# Patient Record
Sex: MALE | Race: WHITE | NOT HISPANIC OR LATINO | URBAN - METROPOLITAN AREA
[De-identification: names, ages, dates, MRNs, and addresses within clinical notes are randomized per-mention and may not be internally consistent; named-entity substitution may affect disease eponyms.]

---

## 2023-03-22 ENCOUNTER — TELEPHONE (OUTPATIENT)
Dept: PRIMARY CARE | Facility: CLINIC | Age: 63
End: 2023-03-22
Payer: MEDICARE

## 2023-03-22 DIAGNOSIS — M10.9 GOUT, UNSPECIFIED CAUSE, UNSPECIFIED CHRONICITY, UNSPECIFIED SITE: Primary | ICD-10-CM

## 2023-03-22 PROBLEM — I10 BENIGN ESSENTIAL HYPERTENSION: Status: ACTIVE | Noted: 2023-03-22

## 2023-03-22 PROBLEM — H40.059 OHT (OCULAR HYPERTENSION): Status: ACTIVE | Noted: 2023-03-22

## 2023-03-22 PROBLEM — M25.512 PAIN IN JOINT OF LEFT SHOULDER: Status: ACTIVE | Noted: 2023-03-22

## 2023-03-22 PROBLEM — H40.9 GLAUCOMA: Status: ACTIVE | Noted: 2023-03-22

## 2023-03-22 PROBLEM — E78.00 HYPERCHOLESTEROLEMIA: Status: ACTIVE | Noted: 2023-03-22

## 2023-03-22 PROBLEM — I25.10 ATHEROSCLEROTIC HEART DISEASE OF NATIVE CORONARY ARTERY WITHOUT ANGINA PECTORIS: Status: ACTIVE | Noted: 2023-03-22

## 2023-03-22 PROBLEM — E11.40 DIABETIC NEUROPATHY (MULTI): Status: ACTIVE | Noted: 2023-03-22

## 2023-03-22 PROBLEM — H26.9 CATARACT: Status: ACTIVE | Noted: 2023-03-22

## 2023-03-22 PROBLEM — H40.1190 POAG (PRIMARY OPEN-ANGLE GLAUCOMA): Status: ACTIVE | Noted: 2023-03-22

## 2023-03-22 PROBLEM — H21.239 PDS (PIGMENTARY DISPERSION SYNDROME): Status: ACTIVE | Noted: 2023-03-22

## 2023-03-22 PROBLEM — M19.90 OSTEOARTHRITIS: Status: ACTIVE | Noted: 2023-03-22

## 2023-03-22 PROBLEM — N18.30 CHRONIC KIDNEY DISEASE, STAGE 3 (MULTI): Status: ACTIVE | Noted: 2023-03-22

## 2023-03-22 PROBLEM — E11.9 DIABETES MELLITUS (MULTI): Status: ACTIVE | Noted: 2023-03-22

## 2023-03-22 RX ORDER — TORSEMIDE 20 MG/1
TABLET ORAL
COMMUNITY
Start: 2019-02-07

## 2023-03-22 RX ORDER — TAMSULOSIN HYDROCHLORIDE 0.4 MG/1
1 CAPSULE ORAL DAILY
COMMUNITY
Start: 2021-09-02 | End: 2023-11-13 | Stop reason: SDUPTHER

## 2023-03-22 RX ORDER — PANTOPRAZOLE SODIUM 40 MG/1
1 TABLET, DELAYED RELEASE ORAL DAILY
COMMUNITY
Start: 2019-05-30 | End: 2023-07-24

## 2023-03-22 RX ORDER — ATORVASTATIN CALCIUM 80 MG/1
1 TABLET, FILM COATED ORAL DAILY
COMMUNITY
Start: 2019-02-07 | End: 2023-12-17

## 2023-03-22 RX ORDER — DORZOLAMIDE HYDROCHLORIDE AND TIMOLOL MALEATE 20; 5 MG/ML; MG/ML
1 SOLUTION/ DROPS OPHTHALMIC 2 TIMES DAILY
COMMUNITY
Start: 2020-03-25

## 2023-03-22 RX ORDER — COLCHICINE 0.6 MG/1
0.6 TABLET ORAL 2 TIMES DAILY
Qty: 6 TABLET | Refills: 0 | Status: SHIPPED | OUTPATIENT
Start: 2023-03-22 | End: 2023-03-25

## 2023-03-22 RX ORDER — BLOOD SUGAR DIAGNOSTIC
STRIP MISCELLANEOUS
COMMUNITY
Start: 2018-03-26 | End: 2023-08-09 | Stop reason: ALTCHOICE

## 2023-03-22 RX ORDER — METOLAZONE 2.5 MG/1
TABLET ORAL
COMMUNITY
End: 2023-08-09 | Stop reason: ALTCHOICE

## 2023-03-22 RX ORDER — INSULIN GLARGINE 100 [IU]/ML
INJECTION, SOLUTION SUBCUTANEOUS
COMMUNITY
Start: 2019-02-07 | End: 2023-12-17

## 2023-03-22 RX ORDER — LATANOPROSTENE BUNOD 0.24 MG/ML
SOLUTION/ DROPS OPHTHALMIC
COMMUNITY
Start: 2020-09-30

## 2023-03-22 RX ORDER — METOPROLOL SUCCINATE 25 MG/1
TABLET, EXTENDED RELEASE ORAL
COMMUNITY

## 2023-03-22 RX ORDER — CARVEDILOL 12.5 MG/1
TABLET ORAL
COMMUNITY
Start: 2021-10-05 | End: 2023-08-15

## 2023-03-22 RX ORDER — ISOSORBIDE MONONITRATE 60 MG/1
1 TABLET, EXTENDED RELEASE ORAL DAILY
COMMUNITY
End: 2024-04-03 | Stop reason: WASHOUT

## 2023-03-22 RX ORDER — INSULIN LISPRO 100 [IU]/ML
INJECTION, SOLUTION INTRAVENOUS; SUBCUTANEOUS
COMMUNITY
Start: 2020-10-04

## 2023-03-22 RX ORDER — PANTOPRAZOLE SODIUM 20 MG/1
TABLET, DELAYED RELEASE ORAL
COMMUNITY
Start: 2023-01-22 | End: 2024-04-18 | Stop reason: SDUPTHER

## 2023-03-22 RX ORDER — AMLODIPINE BESYLATE 10 MG/1
1 TABLET ORAL DAILY
COMMUNITY
Start: 2019-02-07 | End: 2023-08-09 | Stop reason: ALTCHOICE

## 2023-03-22 RX ORDER — BLOOD SUGAR DIAGNOSTIC
STRIP MISCELLANEOUS
COMMUNITY
Start: 2019-04-17 | End: 2024-03-04

## 2023-03-22 RX ORDER — BRIMONIDINE TARTRATE 2 MG/ML
SOLUTION/ DROPS OPHTHALMIC 2 TIMES DAILY
COMMUNITY
Start: 2019-11-04

## 2023-04-13 LAB
PROSTATE SPECIFIC AG (NG/ML) IN SER/PLAS: 4.4 NG/ML (ref 0–4)
PROSTATE SPECIFIC AG FREE (NG/ML) IN SER/PLAS: 1.4 NG/ML
PROSTATE SPECIFIC AG FREE/PROSTATE SPECIFIC AG TOTAL IN SER/PLAS: 32 %

## 2023-07-24 DIAGNOSIS — K21.9 GASTROESOPHAGEAL REFLUX DISEASE WITHOUT ESOPHAGITIS: Primary | ICD-10-CM

## 2023-07-24 RX ORDER — PANTOPRAZOLE SODIUM 40 MG/1
40 TABLET, DELAYED RELEASE ORAL DAILY
Qty: 90 TABLET | Refills: 1 | Status: SHIPPED | OUTPATIENT
Start: 2023-07-24 | End: 2023-08-09 | Stop reason: ALTCHOICE

## 2023-08-09 ENCOUNTER — OFFICE VISIT (OUTPATIENT)
Dept: PRIMARY CARE | Facility: CLINIC | Age: 63
End: 2023-08-09
Payer: MEDICARE

## 2023-08-09 ENCOUNTER — LAB (OUTPATIENT)
Dept: LAB | Facility: LAB | Age: 63
End: 2023-08-09
Payer: MEDICARE

## 2023-08-09 VITALS
SYSTOLIC BLOOD PRESSURE: 136 MMHG | OXYGEN SATURATION: 98 % | DIASTOLIC BLOOD PRESSURE: 86 MMHG | WEIGHT: 274.1 LBS | RESPIRATION RATE: 18 BRPM | HEART RATE: 92 BPM | BODY MASS INDEX: 39.24 KG/M2 | HEIGHT: 70 IN

## 2023-08-09 DIAGNOSIS — Z79.4 TYPE 2 DIABETES MELLITUS WITH CHRONIC KIDNEY DISEASE, WITH LONG-TERM CURRENT USE OF INSULIN, UNSPECIFIED CKD STAGE (MULTI): ICD-10-CM

## 2023-08-09 DIAGNOSIS — N18.30 STAGE 3 CHRONIC KIDNEY DISEASE, UNSPECIFIED WHETHER STAGE 3A OR 3B CKD (MULTI): ICD-10-CM

## 2023-08-09 DIAGNOSIS — Z95.0 CARDIAC PACEMAKER IN SITU: ICD-10-CM

## 2023-08-09 DIAGNOSIS — I50.33 ACUTE ON CHRONIC DIASTOLIC CONGESTIVE HEART FAILURE (MULTI): ICD-10-CM

## 2023-08-09 DIAGNOSIS — E11.42 DIABETIC POLYNEUROPATHY ASSOCIATED WITH TYPE 2 DIABETES MELLITUS (MULTI): ICD-10-CM

## 2023-08-09 DIAGNOSIS — I25.10 ATHEROSCLEROSIS OF NATIVE CORONARY ARTERY OF NATIVE HEART WITHOUT ANGINA PECTORIS: Primary | ICD-10-CM

## 2023-08-09 DIAGNOSIS — Z00.00 MEDICARE ANNUAL WELLNESS VISIT, SUBSEQUENT: ICD-10-CM

## 2023-08-09 DIAGNOSIS — E11.22 TYPE 2 DIABETES MELLITUS WITH CHRONIC KIDNEY DISEASE, WITH LONG-TERM CURRENT USE OF INSULIN, UNSPECIFIED CKD STAGE (MULTI): ICD-10-CM

## 2023-08-09 DIAGNOSIS — I25.10 ATHEROSCLEROSIS OF NATIVE CORONARY ARTERY OF NATIVE HEART WITHOUT ANGINA PECTORIS: ICD-10-CM

## 2023-08-09 DIAGNOSIS — E66.01 OBESITY, CLASS III, BMI 40-49.9 (MORBID OBESITY) (MULTI): ICD-10-CM

## 2023-08-09 LAB
ALANINE AMINOTRANSFERASE (SGPT) (U/L) IN SER/PLAS: 20 U/L (ref 10–52)
ALBUMIN (G/DL) IN SER/PLAS: 3.9 G/DL (ref 3.4–5)
ALKALINE PHOSPHATASE (U/L) IN SER/PLAS: 76 U/L (ref 33–136)
ANION GAP IN SER/PLAS: 13 MMOL/L (ref 10–20)
ASPARTATE AMINOTRANSFERASE (SGOT) (U/L) IN SER/PLAS: 20 U/L (ref 9–39)
BILIRUBIN TOTAL (MG/DL) IN SER/PLAS: 0.7 MG/DL (ref 0–1.2)
CALCIUM (MG/DL) IN SER/PLAS: 9.3 MG/DL (ref 8.6–10.3)
CARBON DIOXIDE, TOTAL (MMOL/L) IN SER/PLAS: 27 MMOL/L (ref 21–32)
CHLORIDE (MMOL/L) IN SER/PLAS: 104 MMOL/L (ref 98–107)
CREATININE (MG/DL) IN SER/PLAS: 1.14 MG/DL (ref 0.5–1.3)
ERYTHROCYTE DISTRIBUTION WIDTH (RATIO) BY AUTOMATED COUNT: 21 % (ref 11.5–14.5)
ERYTHROCYTE MEAN CORPUSCULAR HEMOGLOBIN CONCENTRATION (G/DL) BY AUTOMATED: 28.3 G/DL (ref 32–36)
ERYTHROCYTE MEAN CORPUSCULAR VOLUME (FL) BY AUTOMATED COUNT: 79 FL (ref 80–100)
ERYTHROCYTES (10*6/UL) IN BLOOD BY AUTOMATED COUNT: 5.36 X10E12/L (ref 4.5–5.9)
GFR MALE: 72 ML/MIN/1.73M2
GLUCOSE (MG/DL) IN SER/PLAS: 115 MG/DL (ref 74–99)
HEMATOCRIT (%) IN BLOOD BY AUTOMATED COUNT: 42.1 % (ref 41–52)
HEMOGLOBIN (G/DL) IN BLOOD: 11.9 G/DL (ref 13.5–17.5)
LEUKOCYTES (10*3/UL) IN BLOOD BY AUTOMATED COUNT: 9.5 X10E9/L (ref 4.4–11.3)
PLATELETS (10*3/UL) IN BLOOD AUTOMATED COUNT: 172 X10E9/L (ref 150–450)
POTASSIUM (MMOL/L) IN SER/PLAS: 4.1 MMOL/L (ref 3.5–5.3)
PROTEIN TOTAL: 7.2 G/DL (ref 6.4–8.2)
SODIUM (MMOL/L) IN SER/PLAS: 140 MMOL/L (ref 136–145)
UREA NITROGEN (MG/DL) IN SER/PLAS: 17 MG/DL (ref 6–23)

## 2023-08-09 PROCEDURE — 85027 COMPLETE CBC AUTOMATED: CPT

## 2023-08-09 PROCEDURE — 36415 COLL VENOUS BLD VENIPUNCTURE: CPT

## 2023-08-09 PROCEDURE — 99214 OFFICE O/P EST MOD 30 MIN: CPT | Performed by: NURSE PRACTITIONER

## 2023-08-09 PROCEDURE — 3079F DIAST BP 80-89 MM HG: CPT | Performed by: NURSE PRACTITIONER

## 2023-08-09 PROCEDURE — 4010F ACE/ARB THERAPY RXD/TAKEN: CPT | Performed by: NURSE PRACTITIONER

## 2023-08-09 PROCEDURE — 80053 COMPREHEN METABOLIC PANEL: CPT

## 2023-08-09 PROCEDURE — 3008F BODY MASS INDEX DOCD: CPT | Performed by: NURSE PRACTITIONER

## 2023-08-09 PROCEDURE — 1036F TOBACCO NON-USER: CPT | Performed by: NURSE PRACTITIONER

## 2023-08-09 PROCEDURE — 3075F SYST BP GE 130 - 139MM HG: CPT | Performed by: NURSE PRACTITIONER

## 2023-08-09 PROCEDURE — 3051F HG A1C>EQUAL 7.0%<8.0%: CPT | Performed by: NURSE PRACTITIONER

## 2023-08-09 PROCEDURE — G0439 PPPS, SUBSEQ VISIT: HCPCS | Performed by: NURSE PRACTITIONER

## 2023-08-09 RX ORDER — ASPIRIN 81 MG/1
81 TABLET ORAL ONCE
COMMUNITY

## 2023-08-09 RX ORDER — LOSARTAN POTASSIUM 25 MG/1
25 TABLET ORAL DAILY
Qty: 30 TABLET | Refills: 2 | COMMUNITY
Start: 2023-07-13 | End: 2023-10-11

## 2023-08-09 RX ORDER — METFORMIN HYDROCHLORIDE 500 MG/1
500 TABLET ORAL 2 TIMES DAILY
COMMUNITY
Start: 2023-03-15 | End: 2024-03-04

## 2023-08-09 ASSESSMENT — ENCOUNTER SYMPTOMS
FATIGUE: 1
POLYPHAGIA: 0
WEIGHT LOSS: 0
BLURRED VISION: 0
CONFUSION: 0
NERVOUS/ANXIOUS: 0
HUNGER: 0
WEAKNESS: 1
POLYDIPSIA: 1
TREMORS: 0
SWEATS: 0
SEIZURES: 0
HEADACHES: 0
BLACKOUTS: 0
SPEECH DIFFICULTY: 0
DIZZINESS: 0
VISUAL CHANGE: 0

## 2023-08-09 ASSESSMENT — PATIENT HEALTH QUESTIONNAIRE - PHQ9
SUM OF ALL RESPONSES TO PHQ9 QUESTIONS 1 AND 2: 0
1. LITTLE INTEREST OR PLEASURE IN DOING THINGS: NOT AT ALL
2. FEELING DOWN, DEPRESSED OR HOPELESS: NOT AT ALL

## 2023-08-09 ASSESSMENT — ACTIVITIES OF DAILY LIVING (ADL)
TAKING_MEDICATION: INDEPENDENT
GROCERY_SHOPPING: INDEPENDENT
DOING_HOUSEWORK: INDEPENDENT
DRESSING: INDEPENDENT
BATHING: INDEPENDENT
MANAGING_FINANCES: INDEPENDENT

## 2023-08-09 NOTE — PATIENT INSTRUCTIONS
Have your lab work done  Your sugars have been doing well  Follow up with the cardiologist to check your new pacemaker  Follow up with me in 6 months

## 2023-08-09 NOTE — PROGRESS NOTES
Answers submitted by the patient for this visit:  Diabetes Questionnaire (Submitted on 8/9/2023)  Chief Complaint: Diabetes problem  Diabetes type: type 1  MedicAlert ID: No  Disease duration: 23 Years  blurred vision: No  chest pain: Yes  fatigue: Yes  foot paresthesias: No  foot ulcerations: No  polydipsia: Yes  polyphagia: No  polyuria: Yes  visual change: No  weakness: Yes  weight loss: No  Symptom course: improving  confusion: No  dizziness: No  headaches: No  hunger: No  mood changes: No  nervous/anxious: No  pallor: No  seizures: No  sleepiness: Yes  speech difficulty: No  sweats: No  tremors: No  blackouts: No  hospitalization: No  nocturnal hypoglycemia: No  required assistance: No  required glucagon: No  CVA: No  heart disease: Yes  impotence: No  nephropathy: No  peripheral neuropathy: Yes  PVD: Yes  retinopathy: Yes  CAD risks: dyslipidemia, family history, hypertension, obesity  Current treatments: diet, insulin injections, oral agent (monotherapy)  Treatment compliance: all of the time  Dose schedule: pre-breakfast, pre-lunch, pre-dinner, at bedtime  Given by: patient  Injection sites: abdominal wall  Home blood tests: 3-4 x per day  Home urines: <1 x per month  Monitoring compliance: excellent  Blood glucose trend: decreasing rapidly  breakfast time: after 10 am  breakfast glucose level: 110-130  lunch time: 2-3 pm  lunch glucose level: 130-140  dinner time: 7-8 pm  dinner glucose level: 140-180  High score: 140-180  Overall: 130-140  Weight trend: decreasing rapidly  Current diet: low fat/cholesterol  Meal planning: avoidance of concentrated sweets  Exercise: intermittently  Dietitian visit: No  Eye exam current: Yes  Sees podiatrist: Yes  Subjective   Patient ID: Tito Christian is a 62 y.o. male who presents for Follow-up (Patient in today for routine F/U, reports no concerns at this time. ) and Medicare Annual Wellness Visit Subsequent.    Diabetes  He has type 1 diabetes mellitus. No MedicAlert  identification noted. The initial diagnosis of diabetes was made 23 years ago. Hypoglycemia symptoms include sleepiness. Pertinent negatives for hypoglycemia include no confusion, dizziness, headaches, hunger, mood changes, nervousness/anxiousness, pallor, seizures, speech difficulty, sweats or tremors. Associated symptoms include chest pain, fatigue, polydipsia, polyuria and weakness. Pertinent negatives for diabetes include no blurred vision, no foot paresthesias, no foot ulcerations, no polyphagia, no visual change and no weight loss. Pertinent negatives for hypoglycemia complications include no blackouts, no hospitalization, no nocturnal hypoglycemia, no required assistance and no required glucagon injection. Symptoms are improving. Diabetic complications include heart disease, peripheral neuropathy, PVD and retinopathy. Pertinent negatives for diabetic complications include no CVA, impotence or nephropathy. Risk factors for coronary artery disease include dyslipidemia, family history, hypertension and obesity. Current diabetic treatment includes diet, insulin injections and oral agent (monotherapy). He is compliant with treatment all of the time. He is currently taking insulin pre-breakfast, pre-lunch, pre-dinner and at bedtime. Insulin injections are given by patient. Rotation sites for injection include the abdominal wall. His weight is decreasing rapidly. He is following a low fat/cholesterol diet. Meal planning includes avoidance of concentrated sweets. He has not had a previous visit with a dietitian. He participates in exercise intermittently. He monitors blood glucose at home 3-4 x per day. He monitors urine at home <1 x per month. Blood glucose monitoring compliance is excellent. His home blood glucose trend is decreasing rapidly. His breakfast blood glucose is taken after 10 am. His breakfast blood glucose range is generally 110-130 mg/dl. His lunch blood glucose is taken between 2-3 pm. His lunch  "blood glucose range is generally 130-140 mg/dl. His dinner blood glucose is taken between 7-8 pm. His dinner blood glucose range is generally 140-180 mg/dl. His overall blood glucose range is 130-140 mg/dl. He sees a podiatrist.Eye exam is current.        Review of Systems   Constitutional:  Positive for fatigue. Negative for weight loss.   Eyes:  Negative for blurred vision.   Cardiovascular:  Positive for chest pain.   Endocrine: Positive for polydipsia and polyuria. Negative for polyphagia.   Genitourinary:  Negative for impotence.   Skin:  Negative for pallor.   Neurological:  Positive for weakness. Negative for dizziness, tremors, seizures, speech difficulty and headaches.   Psychiatric/Behavioral:  Negative for confusion. The patient is not nervous/anxious.        Objective   /86   Pulse 92   Resp 18   Ht 1.778 m (5' 10\")   Wt 124 kg (274 lb 1.6 oz)   SpO2 98%   BMI 39.33 kg/m²     Physical Exam    Assessment/Plan          "

## 2023-08-09 NOTE — PROGRESS NOTES
Subjective   Reason for Visit: Tito Christian is an 62 y.o. male here for a Medicare Wellness visit.     Past Medical, Surgical, and Family History reviewed and updated in chart.    Reviewed all medications by prescribing practitioner or clinical pharmacist (such as prescriptions, OTCs, herbal therapies and supplements) and documented in the medical record.    Presents to follow up and for AMW    Follows Dr. Segura for Dm A1c in June 7.0  Denies polyuria, polydipsia, or blurred vision    Last month had worsening shortness of breath - found to have bradycardia and CHB.  DDD pacemaker inserted.  Follows [facility] cardiology  Site is healing without infection.  Area is 'sore'  He has returned to driving  Has not had shortness of breath since discharge    Did have bilateral leg wounds - ID was consulted while in the hospital     Had sleep study in the hospital - +DIANE and using CPAP    Diabetes  He has type 1 diabetes mellitus. No MedicAlert identification noted. The initial diagnosis of diabetes was made 23 years ago. Hypoglycemia symptoms include sleepiness. Pertinent negatives for hypoglycemia include no confusion, dizziness, headaches, hunger, mood changes, nervousness/anxiousness, pallor, seizures, speech difficulty, sweats or tremors. Associated symptoms include chest pain, fatigue, polydipsia, polyuria and weakness. Pertinent negatives for diabetes include no blurred vision, no foot paresthesias, no foot ulcerations, no polyphagia, no visual change and no weight loss. Pertinent negatives for hypoglycemia complications include no blackouts, no hospitalization, no nocturnal hypoglycemia, no required assistance and no required glucagon injection. Symptoms are improving. Diabetic complications include heart disease, peripheral neuropathy, PVD and retinopathy. Pertinent negatives for diabetic complications include no CVA, impotence or nephropathy. Risk factors for coronary artery disease include dyslipidemia, family  "history, hypertension and obesity. Current diabetic treatment includes diet, insulin injections and oral agent (monotherapy). He is compliant with treatment all of the time. He is currently taking insulin pre-breakfast, pre-lunch, pre-dinner and at bedtime. Insulin injections are given by patient. Rotation sites for injection include the abdominal wall. His weight is decreasing rapidly. He is following a low fat/cholesterol diet. Meal planning includes avoidance of concentrated sweets. He has not had a previous visit with a dietitian. He participates in exercise intermittently. He monitors blood glucose at home 3-4 x per day. He monitors urine at home <1 x per month. Blood glucose monitoring compliance is excellent. His home blood glucose trend is decreasing rapidly. His breakfast blood glucose is taken after 10 am. His breakfast blood glucose range is generally 110-130 mg/dl. His lunch blood glucose is taken between 2-3 pm. His lunch blood glucose range is generally 130-140 mg/dl. His dinner blood glucose is taken between 7-8 pm. His dinner blood glucose range is generally 140-180 mg/dl. His overall blood glucose range is 130-140 mg/dl. He sees a podiatrist.Eye exam is current.       Patient Care Team:  MARVIN Aranda as PCP - General  MARVIN Aranda as PCP - [employer] PCP     Review of Systems   Constitutional:  Positive for fatigue. Negative for weight loss.   Eyes:  Negative for blurred vision.   Cardiovascular:  Positive for chest pain.   Endocrine: Positive for polydipsia and polyuria. Negative for polyphagia.   Genitourinary:  Negative for impotence.   Skin:  Negative for pallor.   Neurological:  Positive for weakness. Negative for dizziness, tremors, seizures, speech difficulty and headaches.   Psychiatric/Behavioral:  Negative for confusion. The patient is not nervous/anxious.        Objective   Vitals:  /86   Pulse 92   Resp 18   Ht 1.778 m (5' 10\")   Wt " 124 kg (274 lb 1.6 oz)   SpO2 98%   BMI 39.33 kg/m²       Physical Exam  Vitals and nursing note reviewed.   Constitutional:       General: He is not in acute distress.     Appearance: Normal appearance.   HENT:      Head: Normocephalic and atraumatic.      Right Ear: Tympanic membrane, ear canal and external ear normal.      Left Ear: Tympanic membrane, ear canal and external ear normal.      Nose: Nose normal.      Mouth/Throat:      Mouth: Mucous membranes are moist.      Pharynx: Oropharynx is clear.   Eyes:      Extraocular Movements: Extraocular movements intact.      Conjunctiva/sclera: Conjunctivae normal.      Pupils: Pupils are equal, round, and reactive to light.   Neck:      Vascular: No carotid bruit.   Cardiovascular:      Rate and Rhythm: Normal rate and regular rhythm.      Pulses: Normal pulses.      Heart sounds: Normal heart sounds.   Pulmonary:      Effort: Pulmonary effort is normal.      Breath sounds: Normal breath sounds.   Abdominal:      General: Bowel sounds are normal. There is no distension.      Palpations: Abdomen is soft.      Tenderness: There is no abdominal tenderness.   Musculoskeletal:         General: Normal range of motion.      Cervical back: Normal range of motion.      Right lower leg: No edema.      Left lower leg: No edema.   Lymphadenopathy:      Cervical: No cervical adenopathy.   Skin:     General: Skin is warm and dry.      Capillary Refill: Capillary refill takes less than 2 seconds.      Comments: Pacemaker incision healing - no signs of infection   Neurological:      General: No focal deficit present.      Mental Status: He is alert and oriented to person, place, and time. Mental status is at baseline.   Psychiatric:         Mood and Affect: Mood normal.         Behavior: Behavior normal.         Thought Content: Thought content normal.         Judgment: Judgment normal.         Assessment/Plan   Problem List Items Addressed This Visit       Atherosclerotic heart  disease of native coronary artery without angina pectoris - Primary    Relevant Orders    CBC (Completed)    Comprehensive Metabolic Panel (Completed)    Chronic kidney disease, stage 3 (CMS/[facility])    Current Assessment & Plan     Follows nephrology   Last creatinine 1.14         Diabetes mellitus (CMS/[facility])    Overview     Follows endocrinology  Stable A1c 7.0  Follow up with endo as scheduled  Continue metformin  Continue lantus   Continue humalog  Continue jardiance   Current eye exam         Current Assessment & Plan     Follows endocrinology  Stable A1c 7.0  Follow up with endo as scheduled  Continue metformin  Continue lantus   Continue humalog  Continue jardiance   Current eye exam         Diabetic neuropathy (CMS/[facility])    Overview     Follows endocrinology  Stable A1c 7.0  Follow up with endo as scheduled  Continue metformin  Continue lantus   Continue humalog  Continue jardiance   Current eye exam         Current Assessment & Plan     Follows endocrinology  Stable A1c 7.0  Follow up with endo as scheduled  Continue metformin  Continue lantus   Continue humalog  Continue jardiance   Current eye exam         Acute on chronic diastolic congestive heart failure (CMS/[facility])    Overview     PPM inserted one month ago  Follows cardiology at [facility]  Has been stable since discharge from hospital one month ago  Cbc,cmp         Current Assessment & Plan     PPM inserted one month ago  Follows cardiology at [facility]  Has been stable since discharge from hospital one month ago  Cbc,cmp         Cardiac pacemaker in situ    Overview     PPM inserted one month ago  Follows cardiology at [facility]  Has been stable since discharge from hospital one month ago  Cbc,cmp         Current Assessment & Plan     PPM inserted one month ago  Follows cardiology at [facility]  Has been stable since discharge from hospital one month ago  Cbc,cmp         Obesity, Class III, BMI 40-49.9 (morbid obesity) (CMS/[facility])    Current Assessment & Plan     In a face to face  session, I informed patient of their BMI>30. We discussed appropriate nutrition choices and exercise plan to help achieve weight reduction.           Medicare annual wellness visit, subsequent    Overview     - UTD with vaccine   -  colonoscopy 2013 - will be due this year            Current Assessment & Plan     - UTD with vaccine   -  colonoscopy 2013 - will be due this year

## 2023-08-10 PROBLEM — I10 HTN (HYPERTENSION): Status: RESOLVED | Noted: 2019-02-28 | Resolved: 2023-08-10

## 2023-08-10 PROBLEM — R07.9 CHEST PAIN: Status: RESOLVED | Noted: 2023-08-10 | Resolved: 2023-08-10

## 2023-08-10 PROBLEM — M10.00 ACUTE IDIOPATHIC GOUT: Status: RESOLVED | Noted: 2023-07-10 | Resolved: 2023-08-10

## 2023-08-10 PROBLEM — G89.18 ACUTE POSTOPERATIVE PAIN: Status: RESOLVED | Noted: 2019-03-01 | Resolved: 2023-08-10

## 2023-08-10 PROBLEM — Z98.49 CATARACT EXTRACTION STATUS: Status: RESOLVED | Noted: 2023-08-10 | Resolved: 2023-08-10

## 2023-08-10 PROBLEM — I45.9 HEART BLOCK: Status: RESOLVED | Noted: 2023-06-30 | Resolved: 2023-08-10

## 2023-08-10 PROBLEM — I44.1 MOBITZ TYPE 1 SECOND DEGREE AV BLOCK: Status: RESOLVED | Noted: 2023-07-03 | Resolved: 2023-08-10

## 2023-08-10 PROBLEM — H40.9 GLAUCOMA: Status: RESOLVED | Noted: 2023-03-22 | Resolved: 2023-08-10

## 2023-08-10 PROBLEM — Z95.0 CARDIAC PACEMAKER IN SITU: Status: ACTIVE | Noted: 2023-07-10

## 2023-08-10 PROBLEM — M10.9 GOUT: Status: RESOLVED | Noted: 2023-08-10 | Resolved: 2023-08-10

## 2023-08-10 PROBLEM — M25.562 LEFT KNEE PAIN: Status: RESOLVED | Noted: 2023-08-10 | Resolved: 2023-08-10

## 2023-08-10 PROBLEM — I50.33 ACUTE ON CHRONIC DIASTOLIC CONGESTIVE HEART FAILURE (MULTI): Status: ACTIVE | Noted: 2019-04-09

## 2023-08-10 PROBLEM — I44.2: Status: RESOLVED | Noted: 2023-08-10 | Resolved: 2023-08-10

## 2023-08-10 PROBLEM — N40.0 BPH (BENIGN PROSTATIC HYPERPLASIA): Status: ACTIVE | Noted: 2023-07-03

## 2023-08-10 PROBLEM — Z00.00 ROUTINE GENERAL MEDICAL EXAMINATION AT HEALTH CARE FACILITY: Status: ACTIVE | Noted: 2023-08-10

## 2023-08-10 PROBLEM — E66.813 OBESITY, CLASS III, BMI 40-49.9 (MORBID OBESITY): Status: ACTIVE | Noted: 2023-08-10

## 2023-08-10 PROBLEM — J39.2 EDEMA OF THROAT: Status: RESOLVED | Noted: 2019-02-28 | Resolved: 2023-08-10

## 2023-08-10 PROBLEM — R00.1 BRADYCARDIA: Status: RESOLVED | Noted: 2023-08-10 | Resolved: 2023-08-10

## 2023-08-10 PROBLEM — H26.9 CATARACT: Status: RESOLVED | Noted: 2023-03-22 | Resolved: 2023-08-10

## 2023-08-10 PROBLEM — I45.10 RBBB (RIGHT BUNDLE BRANCH BLOCK): Status: RESOLVED | Noted: 2023-07-03 | Resolved: 2023-08-10

## 2023-08-10 PROBLEM — E66.01 OBESITY, CLASS III, BMI 40-49.9 (MORBID OBESITY) (MULTI): Status: ACTIVE | Noted: 2023-08-10

## 2023-08-10 PROBLEM — M25.512 PAIN IN JOINT OF LEFT SHOULDER: Status: RESOLVED | Noted: 2023-03-22 | Resolved: 2023-08-10

## 2023-08-10 PROBLEM — I50.9 CHF (CONGESTIVE HEART FAILURE) (MULTI): Status: RESOLVED | Noted: 2023-08-10 | Resolved: 2023-08-10

## 2023-08-10 ASSESSMENT — PATIENT HEALTH QUESTIONNAIRE - PHQ9
1. LITTLE INTEREST OR PLEASURE IN DOING THINGS: NOT AT ALL
SUM OF ALL RESPONSES TO PHQ9 QUESTIONS 1 AND 2: 0
2. FEELING DOWN, DEPRESSED OR HOPELESS: NOT AT ALL

## 2023-08-10 NOTE — ASSESSMENT & PLAN NOTE
PPM inserted one month ago  Follows cardiology at [facility]  Has been stable since discharge from hospital one month ago  Cbc,cmp

## 2023-08-10 NOTE — ASSESSMENT & PLAN NOTE
PPM inserted one month ago  Follows cardiology at [facility]  Has been stable since discharge from hospital one month ago  Cbc,cmp

## 2023-08-10 NOTE — ASSESSMENT & PLAN NOTE
Follows endocrinology  Stable A1c 7.0  Follow up with endo as scheduled  Continue metformin  Continue lantus   Continue humalog  Continue jardiance   Current eye exam

## 2023-08-13 DIAGNOSIS — I10 BENIGN ESSENTIAL HYPERTENSION: Primary | ICD-10-CM

## 2023-08-15 RX ORDER — CARVEDILOL 12.5 MG/1
12.5 TABLET ORAL
Qty: 180 TABLET | Refills: 3 | Status: SHIPPED | OUTPATIENT
Start: 2023-08-15 | End: 2024-04-18 | Stop reason: SDUPTHER

## 2023-08-31 PROBLEM — Z79.4 LONG TERM CURRENT USE OF INSULIN (MULTI): Status: ACTIVE | Noted: 2023-08-31

## 2023-08-31 PROBLEM — R06.02 SOB (SHORTNESS OF BREATH) ON EXERTION: Status: ACTIVE | Noted: 2023-08-31

## 2023-08-31 PROBLEM — I25.119 CORONARY ARTERY DISEASE INVOLVING NATIVE CORONARY ARTERY OF NATIVE HEART WITH ANGINA PECTORIS (CMS-HCC): Status: ACTIVE | Noted: 2019-02-28

## 2023-08-31 PROBLEM — I20.9 ANGINA PECTORIS (CMS-HCC): Status: ACTIVE | Noted: 2023-08-31

## 2023-08-31 PROBLEM — Z96.1 PSEUDOPHAKIA, LEFT EYE: Status: ACTIVE | Noted: 2023-08-31

## 2023-08-31 PROBLEM — E66.812 OBESITY, CLASS II, BMI 35-39.9: Status: ACTIVE | Noted: 2023-06-30

## 2023-08-31 PROBLEM — E66.9 OBESITY: Status: ACTIVE | Noted: 2023-08-31

## 2023-08-31 PROBLEM — M79.602 PAIN OF LEFT UPPER EXTREMITY: Status: ACTIVE | Noted: 2023-08-31

## 2023-08-31 PROBLEM — I48.0 PAROXYSMAL ATRIAL FIBRILLATION (MULTI): Status: ACTIVE | Noted: 2019-03-20

## 2023-08-31 PROBLEM — R39.11 URINARY HESITANCY: Status: ACTIVE | Noted: 2023-08-31

## 2023-08-31 PROBLEM — S81.809A MULTIPLE OPEN WOUNDS OF LOWER LEG: Status: ACTIVE | Noted: 2023-07-01

## 2023-08-31 PROBLEM — Z97.3 WEARS EYEGLASSES: Status: ACTIVE | Noted: 2023-08-31

## 2023-08-31 PROBLEM — H21.239 PIGMENTARY DISPERSION SYNDROME: Status: ACTIVE | Noted: 2023-08-31

## 2023-08-31 PROBLEM — E66.9 OBESITY, CLASS II, BMI 35-39.9: Status: ACTIVE | Noted: 2023-06-30

## 2023-08-31 PROBLEM — E78.49 OTHER HYPERLIPIDEMIA: Status: ACTIVE | Noted: 2019-02-28

## 2023-08-31 PROBLEM — I10 ESSENTIAL HYPERTENSION: Status: ACTIVE | Noted: 2023-08-31

## 2023-08-31 PROBLEM — E78.2 MIXED HYPERLIPIDEMIA: Status: ACTIVE | Noted: 2023-08-31

## 2023-08-31 PROBLEM — H40.1131 PRIMARY OPEN ANGLE GLAUCOMA (POAG) OF BOTH EYES, MILD STAGE: Status: ACTIVE | Noted: 2023-08-31

## 2023-08-31 PROBLEM — R06.02 SHORTNESS OF BREATH: Status: ACTIVE | Noted: 2023-08-31

## 2023-08-31 PROBLEM — E11.51 DIABETES MELLITUS TYPE 2 WITH PERIPHERAL ARTERY DISEASE (MULTI): Status: ACTIVE | Noted: 2019-02-25

## 2023-08-31 PROBLEM — Z96.1 PSEUDOPHAKIA OF LEFT EYE: Status: ACTIVE | Noted: 2023-08-31

## 2023-08-31 PROBLEM — R06.83 SNORING: Status: ACTIVE | Noted: 2023-07-05

## 2023-08-31 PROBLEM — M17.9 KNEE OSTEOARTHRITIS: Status: ACTIVE | Noted: 2023-08-31

## 2023-08-31 PROBLEM — G47.9 SLEEP DISORDER, UNSPECIFIED: Status: ACTIVE | Noted: 2023-07-05

## 2023-08-31 RX ORDER — METOLAZONE 2.5 MG/1
TABLET ORAL
COMMUNITY

## 2023-08-31 RX ORDER — LATANOPROST 50 UG/ML
1 SOLUTION/ DROPS OPHTHALMIC NIGHTLY
COMMUNITY

## 2023-08-31 RX ORDER — PEN NEEDLE, DIABETIC 30 GX3/16"
NEEDLE, DISPOSABLE MISCELLANEOUS 4 TIMES DAILY
COMMUNITY

## 2023-08-31 RX ORDER — BLOOD-GLUCOSE CONTROL, NORMAL
EACH MISCELLANEOUS
COMMUNITY
Start: 2019-01-17

## 2023-09-11 ENCOUNTER — TELEPHONE (OUTPATIENT)
Dept: PHARMACY | Facility: HOSPITAL | Age: 63
End: 2023-09-11
Payer: MEDICARE

## 2023-10-09 ENCOUNTER — APPOINTMENT (OUTPATIENT)
Dept: UROLOGY | Facility: CLINIC | Age: 63
End: 2023-10-09
Payer: MEDICARE

## 2023-10-16 ENCOUNTER — LAB (OUTPATIENT)
Dept: LAB | Facility: LAB | Age: 63
End: 2023-10-16
Payer: MEDICARE

## 2023-10-16 ENCOUNTER — OFFICE VISIT (OUTPATIENT)
Dept: ENDOCRINOLOGY | Facility: CLINIC | Age: 63
End: 2023-10-16
Payer: MEDICARE

## 2023-10-16 ENCOUNTER — OFFICE VISIT (OUTPATIENT)
Dept: PRIMARY CARE | Facility: CLINIC | Age: 63
End: 2023-10-16
Payer: MEDICARE

## 2023-10-16 VITALS
HEART RATE: 81 BPM | BODY MASS INDEX: 38.74 KG/M2 | DIASTOLIC BLOOD PRESSURE: 84 MMHG | SYSTOLIC BLOOD PRESSURE: 156 MMHG | WEIGHT: 270 LBS

## 2023-10-16 DIAGNOSIS — E11.51 DIABETES MELLITUS TYPE 2 WITH PERIPHERAL ARTERY DISEASE (MULTI): ICD-10-CM

## 2023-10-16 DIAGNOSIS — M10.9 ACUTE GOUT OF LEFT ELBOW, UNSPECIFIED CAUSE: Primary | ICD-10-CM

## 2023-10-16 DIAGNOSIS — M10.9 ACUTE GOUT OF LEFT ELBOW, UNSPECIFIED CAUSE: ICD-10-CM

## 2023-10-16 LAB
POC HEMOGLOBIN A1C: 6.2 % (ref 4.2–6.5)
URATE SERPL-MCNC: 6.3 MG/DL (ref 4–7.5)

## 2023-10-16 PROCEDURE — 3077F SYST BP >= 140 MM HG: CPT | Performed by: INTERNAL MEDICINE

## 2023-10-16 PROCEDURE — 1036F TOBACCO NON-USER: CPT | Performed by: INTERNAL MEDICINE

## 2023-10-16 PROCEDURE — 36415 COLL VENOUS BLD VENIPUNCTURE: CPT

## 2023-10-16 PROCEDURE — 83036 HEMOGLOBIN GLYCOSYLATED A1C: CPT | Performed by: INTERNAL MEDICINE

## 2023-10-16 PROCEDURE — 99213 OFFICE O/P EST LOW 20 MIN: CPT | Performed by: STUDENT IN AN ORGANIZED HEALTH CARE EDUCATION/TRAINING PROGRAM

## 2023-10-16 PROCEDURE — 3008F BODY MASS INDEX DOCD: CPT | Performed by: STUDENT IN AN ORGANIZED HEALTH CARE EDUCATION/TRAINING PROGRAM

## 2023-10-16 PROCEDURE — 3051F HG A1C>EQUAL 7.0%<8.0%: CPT | Performed by: STUDENT IN AN ORGANIZED HEALTH CARE EDUCATION/TRAINING PROGRAM

## 2023-10-16 PROCEDURE — 3079F DIAST BP 80-89 MM HG: CPT | Performed by: INTERNAL MEDICINE

## 2023-10-16 PROCEDURE — 99214 OFFICE O/P EST MOD 30 MIN: CPT | Performed by: INTERNAL MEDICINE

## 2023-10-16 PROCEDURE — 3051F HG A1C>EQUAL 7.0%<8.0%: CPT | Performed by: INTERNAL MEDICINE

## 2023-10-16 PROCEDURE — 84550 ASSAY OF BLOOD/URIC ACID: CPT

## 2023-10-16 PROCEDURE — 1036F TOBACCO NON-USER: CPT | Performed by: STUDENT IN AN ORGANIZED HEALTH CARE EDUCATION/TRAINING PROGRAM

## 2023-10-16 PROCEDURE — 3008F BODY MASS INDEX DOCD: CPT | Performed by: INTERNAL MEDICINE

## 2023-10-16 RX ORDER — METHYLPREDNISOLONE 4 MG/1
TABLET ORAL
Qty: 21 TABLET | Refills: 0 | Status: SHIPPED | OUTPATIENT
Start: 2023-10-16 | End: 2023-10-23

## 2023-10-16 ASSESSMENT — ENCOUNTER SYMPTOMS
SHORTNESS OF BREATH: 0
NAUSEA: 0
CHEST TIGHTNESS: 0
COUGH: 0
ARTHRALGIAS: 1
VOMITING: 0
DIARRHEA: 0
UNEXPECTED WEIGHT CHANGE: 0
ENDOCRINE COMMENTS: AS ABOVE

## 2023-10-16 NOTE — PROGRESS NOTES
Subjective   Patient ID: Tito Christian is a 62 y.o. male who presents for No chief complaint on file..    Patient presents to the office today for diabetes follow-up with Dr. Segura.  Patient had complaints of left elbow pain for the last 2 days.  Patient denies any recent triggers for possible gouty flare.  With the joint is warm and swollen.  He has a longstanding history of gout and gouty flares.        Review of Systems    Objective   Physical Exam  Vitals reviewed.   Musculoskeletal:        Arms:       Comments: Left elbow swollen and tenderness on the proximal posterior elbow         Assessment/Plan   Problem List Items Addressed This Visit    None  Visit Diagnoses         Codes    Acute gout of left elbow, unspecified cause    -  Primary M10.9    Patient longstanding history  Vitals significant for hypertension  Prednisone sent  Uric acid ordered     Relevant Medications    methylPREDNISolone (Medrol Dospak) 4 mg tablets    Other Relevant Orders    Uric acid

## 2023-10-16 NOTE — PROGRESS NOTES
History Of Present Illness  Tito Christian is a 62 y.o. male     Duration of type 2 diabetes mellitus:  14 years  Complications:  cardiovascular disease    Current gout flare left elbow.    Lantus 46 units at bedtime  Humalog 20 units before meals.  Add scale (2 units for every 50 over glucose 100 mg/dl)     Metformin 1000 mg BID.   Jardiance 25 mg/day    Patient is testing glucose 3 times daily  Records reviewed, on file    Last eye exam:  April 2023, scheduled this week.     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    Surgical History  He has a past surgical history that includes Colonoscopy (09/23/2014); Other surgical history (10/02/2019); and Other surgical history (08/03/2020).     Social History  He reports that he has never smoked. He has never used smokeless tobacco. He reports that he does not drink alcohol and does not use drugs.    Family History  Family History   Problem Relation Name Age of Onset    Other (cardiac disorder) Father      Other (cardiac disorder) Other Uncle        Allergies  Patient has no known allergies.    Review of Systems   Constitutional:  Negative for unexpected weight change.   Eyes:  Negative for visual disturbance.   Respiratory:  Negative for cough, chest  "tightness and shortness of breath.    Cardiovascular:  Negative for chest pain.   Gastrointestinal:  Negative for diarrhea, nausea and vomiting.   Endocrine:        As above   Musculoskeletal:  Positive for arthralgias (left elbow).         Last Recorded Vitals  Blood pressure 156/84, pulse 81, weight 122 kg (270 lb).    Physical Exam  Constitutional:       General: He is not in acute distress.  HENT:      Head: Normocephalic.   Eyes:      Extraocular Movements: Extraocular movements intact.   Neck:      Thyroid: No thyromegaly.   Cardiovascular:      Pulses:           Radial pulses are 2+ on the right side and 2+ on the left side.   Musculoskeletal:      Right lower leg: No edema.      Left lower leg: No edema.      Comments: No overt erythema at left elbow   Skin:     Comments: Stasis changes at lower legs   Neurological:      Mental Status: He is alert.      Motor: No tremor.   Psychiatric:         Mood and Affect: Affect normal.          Relevant Results  Glucose   Date Value   08/09/2023 115 mg/dL (H)   06/30/2023 CANCELED   06/29/2023 86 mg/dL     POC HEMOGLOBIN A1c (%)   Date Value   10/16/2023 6.2     Hemoglobin A1C (%)   Date Value   07/03/2023 7.0 (H)   06/27/2023 7.0 (A)   11/09/2021 7.9 (H)   03/24/2021 7.9 (H)     Bicarbonate   Date Value   08/09/2023 27 mmol/L   06/30/2023 CANCELED   06/29/2023 28 mmol/L     Urea Nitrogen   Date Value   08/09/2023 17 mg/dL   06/30/2023 CANCELED   06/29/2023 17 mg/dL     Creatinine   Date Value   08/09/2023 1.14 mg/dL   06/30/2023 CANCELED   06/29/2023 1.15 mg/dL     No components found for: \"IDFOEAKYSKMQXL3R\"  Lab Results   Component Value Date    CHOL 118 02/14/2023    CHOL 91 (L) 05/05/2020    CHOL 105 11/04/2019     Lab Results   Component Value Date    HDL 37.5 (A) 02/14/2023    HDL 31 (L) 05/05/2020    HDL 27.0 (A) 11/04/2019     Lab Results   Component Value Date    LDLCALC 25 (L) 05/05/2020    LDLCALC 82 10/18/2018     Lab Results   Component Value Date    TRIG " "121 02/14/2023    TRIG 173 (H) 05/05/2020    TRIG 218 (H) 11/04/2019     No components found for: \"CHOLHDL\"   Lab Results   Component Value Date    TSH 4.37 (H) 06/29/2023     Lab Results   Component Value Date    ALBUR 12 01/14/2019          IMPRESSION  TYPE 2 DIABETES MELLITUS  Rapid A1c 6.2%  Glucose well controlled    Current symptoms of gout  History of recurring gout treated with steroid, colchicine, Toradol  Patient to be seen in [facility] today.      RECOMMENDATIONS  Continue current diabetes regimen  Follow up 6 months    A1c at next appointment if not already done  Labs as ordered by GERARD Caruso.      "

## 2023-11-02 ENCOUNTER — OFFICE VISIT (OUTPATIENT)
Dept: UROLOGY | Facility: CLINIC | Age: 63
End: 2023-11-02
Payer: MEDICARE

## 2023-11-02 ENCOUNTER — LAB (OUTPATIENT)
Dept: LAB | Facility: LAB | Age: 63
End: 2023-11-02
Payer: MEDICARE

## 2023-11-02 DIAGNOSIS — N13.8 BPH WITH OBSTRUCTION/LOWER URINARY TRACT SYMPTOMS: ICD-10-CM

## 2023-11-02 DIAGNOSIS — R97.20 ELEVATED PSA: ICD-10-CM

## 2023-11-02 DIAGNOSIS — N40.1 BPH WITH OBSTRUCTION/LOWER URINARY TRACT SYMPTOMS: ICD-10-CM

## 2023-11-02 DIAGNOSIS — R97.20 ELEVATED PSA: Primary | ICD-10-CM

## 2023-11-02 PROCEDURE — 3079F DIAST BP 80-89 MM HG: CPT | Performed by: STUDENT IN AN ORGANIZED HEALTH CARE EDUCATION/TRAINING PROGRAM

## 2023-11-02 PROCEDURE — 3008F BODY MASS INDEX DOCD: CPT | Performed by: STUDENT IN AN ORGANIZED HEALTH CARE EDUCATION/TRAINING PROGRAM

## 2023-11-02 PROCEDURE — 3077F SYST BP >= 140 MM HG: CPT | Performed by: STUDENT IN AN ORGANIZED HEALTH CARE EDUCATION/TRAINING PROGRAM

## 2023-11-02 PROCEDURE — 99213 OFFICE O/P EST LOW 20 MIN: CPT | Performed by: STUDENT IN AN ORGANIZED HEALTH CARE EDUCATION/TRAINING PROGRAM

## 2023-11-02 PROCEDURE — 36415 COLL VENOUS BLD VENIPUNCTURE: CPT

## 2023-11-02 PROCEDURE — 1036F TOBACCO NON-USER: CPT | Performed by: STUDENT IN AN ORGANIZED HEALTH CARE EDUCATION/TRAINING PROGRAM

## 2023-11-02 PROCEDURE — 84153 ASSAY OF PSA TOTAL: CPT

## 2023-11-02 PROCEDURE — 3051F HG A1C>EQUAL 7.0%<8.0%: CPT | Performed by: STUDENT IN AN ORGANIZED HEALTH CARE EDUCATION/TRAINING PROGRAM

## 2023-11-02 NOTE — PROGRESS NOTES
Subjective   Patient ID: Tito Christian is a 62 y.o. male who presents for follow up on BPH  HPI  Tito Christian is a 62 y.o. male who presents for follow up on BPH.    He was hospitalized in 07/2023 for cardiology issues, had a pacemaker placed.  Taking Tamsulosin and has seen urinary symptom improvement. Satisfied with this medication.  No complaints or worsening symptoms.      Lab Results   Component Value Date    PSA 4.4 (H) 04/10/2023    PSA 3.0 04/11/2022           Assessment/Plan      Tito Christian is a 62 y.o. male who presents for follow up on BPH and rising PSA    We reviewed his overall medical history, and the rising PSA from a measurement 6 months ago just before he got sick and hospitalized.  We will repeat PSA now.    Plan:  Psa today  Refill tamsulosin  Fu in 1-2 months        Scribe Attestation  By signing my name below, IMaria , Scribe   attest that this documentation has been prepared under the direction and in the presence of Migue Collier MD.

## 2023-11-03 LAB — PSA SERPL-MCNC: 3.58 NG/ML

## 2023-11-13 ENCOUNTER — OFFICE VISIT (OUTPATIENT)
Dept: UROLOGY | Facility: CLINIC | Age: 63
End: 2023-11-13
Payer: MEDICARE

## 2023-11-13 DIAGNOSIS — N13.8 BPH WITH OBSTRUCTION/LOWER URINARY TRACT SYMPTOMS: Primary | ICD-10-CM

## 2023-11-13 DIAGNOSIS — N40.1 BPH WITH OBSTRUCTION/LOWER URINARY TRACT SYMPTOMS: Primary | ICD-10-CM

## 2023-11-13 DIAGNOSIS — R97.20 ELEVATED PSA: ICD-10-CM

## 2023-11-13 PROCEDURE — 3077F SYST BP >= 140 MM HG: CPT | Performed by: STUDENT IN AN ORGANIZED HEALTH CARE EDUCATION/TRAINING PROGRAM

## 2023-11-13 PROCEDURE — 1036F TOBACCO NON-USER: CPT | Performed by: STUDENT IN AN ORGANIZED HEALTH CARE EDUCATION/TRAINING PROGRAM

## 2023-11-13 PROCEDURE — 3008F BODY MASS INDEX DOCD: CPT | Performed by: STUDENT IN AN ORGANIZED HEALTH CARE EDUCATION/TRAINING PROGRAM

## 2023-11-13 PROCEDURE — 3051F HG A1C>EQUAL 7.0%<8.0%: CPT | Performed by: STUDENT IN AN ORGANIZED HEALTH CARE EDUCATION/TRAINING PROGRAM

## 2023-11-13 PROCEDURE — 3079F DIAST BP 80-89 MM HG: CPT | Performed by: STUDENT IN AN ORGANIZED HEALTH CARE EDUCATION/TRAINING PROGRAM

## 2023-11-13 PROCEDURE — 99213 OFFICE O/P EST LOW 20 MIN: CPT | Performed by: STUDENT IN AN ORGANIZED HEALTH CARE EDUCATION/TRAINING PROGRAM

## 2023-11-13 RX ORDER — TAMSULOSIN HYDROCHLORIDE 0.4 MG/1
0.8 CAPSULE ORAL DAILY
Qty: 60 CAPSULE | Refills: 11 | Status: SHIPPED | OUTPATIENT
Start: 2023-11-13 | End: 2024-02-13

## 2023-11-13 NOTE — PROGRESS NOTES
Subjective   Patient ID: Tito Christian is a 62 y.o. male.    HPI  Tito Christian is a 62 y.o. male who presents for follow up on BPH. Now PSA 3.5, down from 4.4 in 7 months.      He was hospitalized in 07/2023 for cardiology issues,   had a pacemaker placed.    Taking Tamsulosin double dose and has seen urinary symptom improvement. Satisfied with this medication.  No complaints or worsening symptoms.     Lab Results   Component Value Date    PSA 3.58 11/02/2023    PSA 4.4 (H) 04/10/2023    PSA 3.0 04/11/2022         Review of Systems   All other systems reviewed and are negative.      Objective   Physical Exam  Vitals reviewed.         Assessment/Plan   Tito Christian is a 62 y.o. male who presents for follow up on BPH. Now PSA 3.5, down from 4.4 in 7 months. Will monitor in 6 months.      Taking Tamsulosin double dose and has seen urinary symptom improvement. Satisfied with this medication.  No complaints or worsening symptoms.    Plan:   PSA 6 months.   Continue tamsulosin double dose.   FUV 6 months.   Diagnoses and all orders for this visit:  BPH with obstruction/lower urinary tract symptoms  Elevated PSA

## 2023-11-29 ENCOUNTER — APPOINTMENT (OUTPATIENT)
Dept: OPHTHALMOLOGY | Facility: CLINIC | Age: 63
End: 2023-11-29
Payer: MEDICARE

## 2023-11-30 ENCOUNTER — DOCUMENTATION (OUTPATIENT)
Dept: PRIMARY CARE | Facility: CLINIC | Age: 63
End: 2023-11-30
Payer: MEDICARE

## 2023-11-30 ENCOUNTER — PATIENT OUTREACH (OUTPATIENT)
Dept: PRIMARY CARE | Facility: CLINIC | Age: 63
End: 2023-11-30
Payer: MEDICARE

## 2023-11-30 DIAGNOSIS — R07.9 CHEST PAIN, UNSPECIFIED TYPE: ICD-10-CM

## 2023-11-30 NOTE — PROGRESS NOTES
Discharge Facility: [facility]  Discharge Diagnosis: Chest pain  Admission Date: 11/28/2023  Discharge Date: 11/29/2023    PCP Appointment Date: Message sent to office  Specialist Appointment Date: 1/17/2024 09:30 Dr. Steff Espinoza, Cardiology  Hospital Encounter and Summary: Linked   See discharge assessment below for further details    2 attempts were made to reach patient to assess needs. No contact made. See brief summary:  Wrap Up  Wrap Up Additional Comments: 62yoM sent to ED per cardiology with plan for admission and further cardiac testing. Patient with significant cardiac history including prior bypass and recent pacemaker placement. Patient having frequent exertional chest pain. Testing completed with no further action required per cardiology. Losartan dose was increased from 25mg to 50mg daily for better BP control and afterload reduction. Patient discharged to home self care. (11/30/2023 11:29 AM)

## 2023-12-06 DIAGNOSIS — E11.51 DIABETES MELLITUS TYPE 2 WITH PERIPHERAL ARTERY DISEASE (MULTI): Primary | ICD-10-CM

## 2023-12-12 DIAGNOSIS — E78.00 HYPERCHOLESTEROLEMIA: Primary | ICD-10-CM

## 2023-12-17 RX ORDER — ATORVASTATIN CALCIUM 80 MG/1
80 TABLET, FILM COATED ORAL DAILY
Qty: 90 TABLET | Refills: 3 | Status: SHIPPED | OUTPATIENT
Start: 2023-12-17

## 2023-12-17 RX ORDER — INSULIN GLARGINE 100 [IU]/ML
46 INJECTION, SOLUTION SUBCUTANEOUS DAILY
Qty: 45 ML | Refills: 3 | Status: SHIPPED | OUTPATIENT
Start: 2023-12-17 | End: 2024-12-16

## 2023-12-19 ENCOUNTER — LAB REQUISITION (OUTPATIENT)
Dept: LAB | Facility: LAB | Age: 63
End: 2023-12-19
Payer: MEDICARE

## 2023-12-19 DIAGNOSIS — R30.0 DYSURIA: ICD-10-CM

## 2023-12-19 PROCEDURE — 87086 URINE CULTURE/COLONY COUNT: CPT

## 2023-12-21 LAB — BACTERIA UR CULT: NORMAL

## 2024-01-08 ENCOUNTER — PATIENT OUTREACH (OUTPATIENT)
Dept: PRIMARY CARE | Facility: CLINIC | Age: 64
End: 2024-01-08
Payer: MEDICARE

## 2024-01-08 DIAGNOSIS — I16.0 HYPERTENSIVE URGENCY: ICD-10-CM

## 2024-01-08 DIAGNOSIS — I50.23 ACUTE ON CHRONIC SYSTOLIC HEART FAILURE (MULTI): ICD-10-CM

## 2024-01-08 NOTE — PROGRESS NOTES
Discharge Facility: [place]   Discharge Diagnosis: Dyspnea on exertion, Mild exacerbation of acute on chronic systolic heart failure secondary to hypertensive urgency.     Admission Date: 1/4/2024  Discharge Date: 1/5/2024    PCP Appointment Date: 2/13/2024 13:20  Specialist Appointment Date: 1/17/2024 Dr. Hanane Espinoza, Cardiology [facility],  3/6/2024 13:20 Dr. Pedrito Max, Cardiology [facility]  Hospital Encounter and Summary: Linked     2 attempts were made to reach patient to assess needs. No contact made. See brief summary:  Wrap Up  Wrap Up Additional Comments: 63yoM PMHx of HFpEF, HTN, CAD s/p CABG, T2DM, CHB s/p PPM who presented to ED with BOYKIN x 3 days and chronic unchanged chest soreness. Cardiogy consulted. Patient admitted for mild exacerbation of acute on chronic systolic heart failure secondary to hypertensive urgency. Medications were adjusted and the patient diuresed more than 5 liters. Patient discharged to home self care. Rx's for potassium chloride, losartan, and humalog given. Cardiology follow up scheduled. (1/9/2024 10:48 AM)

## 2024-01-10 RX ORDER — POTASSIUM CHLORIDE 20 MEQ/1
20 TABLET, EXTENDED RELEASE ORAL
COMMUNITY
Start: 2024-01-06

## 2024-01-10 RX ORDER — LOSARTAN POTASSIUM 50 MG/1
50 TABLET ORAL 2 TIMES DAILY
COMMUNITY
Start: 2024-01-05 | End: 2024-04-18 | Stop reason: ALTCHOICE

## 2024-01-11 ENCOUNTER — PATIENT OUTREACH (OUTPATIENT)
Dept: PRIMARY CARE | Facility: CLINIC | Age: 64
End: 2024-01-11
Payer: MEDICARE

## 2024-01-11 NOTE — PROGRESS NOTES
Received phone message from patient stating he is recovering well at home post hospitalization. Patient does not have a PCP follow up until 2/13/2024. Message sent to office requesting a sooner appt for the patient.

## 2024-01-12 ENCOUNTER — TELEPHONE (OUTPATIENT)
Dept: PRIMARY CARE | Facility: CLINIC | Age: 64
End: 2024-01-12
Payer: MEDICARE

## 2024-01-12 NOTE — TELEPHONE ENCOUNTER
Pt appointment is appropriate, no available sooner appointments. Added pt to wait list incase of a sooner opening.        Faiza Sanchez RN  P Do [id number] [facility] Clinical Support Staff  This patient has an appt on 2/13. C an you try to get him in sooner for a hospital follow up. Thanks!    Discharge Facility: [place]  Discharge Diagnosis: Dyspnea on exertion, Mild exacerbation of acute on chronic systolic heart failure secondary to hypertensive urgency.    Admission Date: 1/4/2024  Discharge Date: 1/5/2024

## 2024-01-19 ENCOUNTER — OFFICE VISIT (OUTPATIENT)
Dept: PRIMARY CARE | Facility: CLINIC | Age: 64
End: 2024-01-19
Payer: MEDICARE

## 2024-01-19 VITALS
HEIGHT: 70 IN | SYSTOLIC BLOOD PRESSURE: 149 MMHG | OXYGEN SATURATION: 95 % | WEIGHT: 270 LBS | BODY MASS INDEX: 38.65 KG/M2 | HEART RATE: 80 BPM | DIASTOLIC BLOOD PRESSURE: 77 MMHG

## 2024-01-19 DIAGNOSIS — I10 BENIGN ESSENTIAL HYPERTENSION: ICD-10-CM

## 2024-01-19 DIAGNOSIS — R06.02 SOB (SHORTNESS OF BREATH) ON EXERTION: ICD-10-CM

## 2024-01-19 DIAGNOSIS — I50.33 ACUTE ON CHRONIC DIASTOLIC CONGESTIVE HEART FAILURE (MULTI): ICD-10-CM

## 2024-01-19 DIAGNOSIS — Z09 HOSPITAL DISCHARGE FOLLOW-UP: Primary | ICD-10-CM

## 2024-01-19 PROCEDURE — 3008F BODY MASS INDEX DOCD: CPT

## 2024-01-19 PROCEDURE — 1036F TOBACCO NON-USER: CPT

## 2024-01-19 PROCEDURE — 3077F SYST BP >= 140 MM HG: CPT

## 2024-01-19 PROCEDURE — 3078F DIAST BP <80 MM HG: CPT

## 2024-01-19 PROCEDURE — 99495 TRANSJ CARE MGMT MOD F2F 14D: CPT

## 2024-01-19 PROCEDURE — 4010F ACE/ARB THERAPY RXD/TAKEN: CPT

## 2024-01-19 ASSESSMENT — ENCOUNTER SYMPTOMS
SHORTNESS OF BREATH: 0
STRIDOR: 0
CHOKING: 0
FEVER: 0
PALPITATIONS: 0
FATIGUE: 0
COUGH: 0
WHEEZING: 0
UNEXPECTED WEIGHT CHANGE: 0
CHEST TIGHTNESS: 0

## 2024-01-19 ASSESSMENT — PATIENT HEALTH QUESTIONNAIRE - PHQ9
2. FEELING DOWN, DEPRESSED OR HOPELESS: NOT AT ALL
SUM OF ALL RESPONSES TO PHQ9 QUESTIONS 1 AND 2: 0
1. LITTLE INTEREST OR PLEASURE IN DOING THINGS: NOT AT ALL

## 2024-01-19 NOTE — PROGRESS NOTES
"Subjective   Patient ID: Tito Christian is a 63 y.o. male who presents for Hospital Follow-up (1/4/24).    Pt admitted to [facility] for chg exacerbation diurese 5 liters.  Endorses feeling much better, SOB has returned to baseline, followed up with cardiology last week they agreed to the change in the BP meds. BP good today.  States he is checking BP and weight daily at home        Review of Systems   Constitutional:  Negative for fatigue, fever and unexpected weight change.   Respiratory:  Negative for cough, choking, chest tightness, shortness of breath, wheezing and stridor.    Cardiovascular:  Negative for chest pain, palpitations and leg swelling.       Objective   /77   Pulse 80   Ht 1.778 m (5' 10\")   Wt 122 kg (270 lb)   SpO2 95%   BMI 38.74 kg/m²     Physical Exam  Vitals and nursing note reviewed.   Constitutional:       General: He is not in acute distress.     Appearance: Normal appearance. He is obese. He is not ill-appearing.   Cardiovascular:      Pulses: Normal pulses.      Heart sounds: Heart sounds are distant.   Pulmonary:      Effort: Pulmonary effort is normal.      Breath sounds: Normal breath sounds.   Neurological:      Mental Status: He is alert.         Assessment/Plan   Problem List Items Addressed This Visit             ICD-10-CM    Benign essential hypertension I10    Acute on chronic diastolic congestive heart failure (CMS/HCC) I50.33    SOB (shortness of breath) on exertion R06.02     Other Visit Diagnoses         Codes    Hospital discharge follow-up    -  Primary Z09        RTC 3 months for follow-up with pcp         "

## 2024-02-02 ENCOUNTER — PATIENT OUTREACH (OUTPATIENT)
Dept: PRIMARY CARE | Facility: CLINIC | Age: 64
End: 2024-02-02
Payer: MEDICARE

## 2024-02-02 NOTE — PROGRESS NOTES
Unable to reach patient for call back after patient's follow up appointment with Kelsey Wakefield CNP Primary Care and cardiology. LVM with call back number for patient to call if needed.

## 2024-02-12 DIAGNOSIS — E11.51 DIABETES MELLITUS TYPE 2 WITH PERIPHERAL ARTERY DISEASE (MULTI): Primary | ICD-10-CM

## 2024-02-12 DIAGNOSIS — N40.1 BPH WITH OBSTRUCTION/LOWER URINARY TRACT SYMPTOMS: ICD-10-CM

## 2024-02-12 DIAGNOSIS — N13.8 BPH WITH OBSTRUCTION/LOWER URINARY TRACT SYMPTOMS: ICD-10-CM

## 2024-02-13 ENCOUNTER — APPOINTMENT (OUTPATIENT)
Dept: PRIMARY CARE | Facility: CLINIC | Age: 64
End: 2024-02-13
Payer: MEDICARE

## 2024-02-13 RX ORDER — EMPAGLIFLOZIN 25 MG/1
25 TABLET, FILM COATED ORAL DAILY
Qty: 90 TABLET | Refills: 3 | Status: SHIPPED | OUTPATIENT
Start: 2024-02-13

## 2024-02-13 RX ORDER — TAMSULOSIN HYDROCHLORIDE 0.4 MG/1
0.4 CAPSULE ORAL 2 TIMES DAILY
Qty: 180 CAPSULE | Refills: 3 | Status: SHIPPED | OUTPATIENT
Start: 2024-02-13 | End: 2024-05-30 | Stop reason: SDUPTHER

## 2024-03-04 DIAGNOSIS — E11.51 DIABETES MELLITUS TYPE 2 WITH PERIPHERAL ARTERY DISEASE (MULTI): Primary | ICD-10-CM

## 2024-03-04 RX ORDER — BLOOD SUGAR DIAGNOSTIC
STRIP MISCELLANEOUS
Qty: 300 STRIP | Refills: 3 | Status: SHIPPED | OUTPATIENT
Start: 2024-03-04

## 2024-03-04 RX ORDER — METFORMIN HYDROCHLORIDE 500 MG/1
500 TABLET ORAL
Qty: 180 TABLET | Refills: 3 | Status: SHIPPED | OUTPATIENT
Start: 2024-03-04

## 2024-04-03 ENCOUNTER — OFFICE VISIT (OUTPATIENT)
Dept: OPHTHALMOLOGY | Facility: CLINIC | Age: 64
End: 2024-04-03
Payer: MEDICARE

## 2024-04-03 DIAGNOSIS — H40.1131 PRIMARY OPEN ANGLE GLAUCOMA (POAG) OF BOTH EYES, MILD STAGE: Primary | ICD-10-CM

## 2024-04-03 PROCEDURE — 99214 OFFICE O/P EST MOD 30 MIN: CPT | Performed by: OPHTHALMOLOGY

## 2024-04-03 PROCEDURE — 92133 CPTRZD OPH DX IMG PST SGM ON: CPT | Performed by: OPHTHALMOLOGY

## 2024-04-03 ASSESSMENT — TONOMETRY
OS_IOP_MMHG: 18
OD_IOP_MMHG: 17
IOP_METHOD: GOLDMANN APPLANATION

## 2024-04-03 ASSESSMENT — VISUAL ACUITY
OS_CC: 20/20
CORRECTION_TYPE: GLASSES
OD_CC+: +2
OD_CC: 20/25
METHOD: SNELLEN - LINEAR

## 2024-04-03 ASSESSMENT — ENCOUNTER SYMPTOMS
EYES NEGATIVE: 0
ALLERGIC/IMMUNOLOGIC NEGATIVE: 0
GASTROINTESTINAL NEGATIVE: 0
MUSCULOSKELETAL NEGATIVE: 0
ENDOCRINE NEGATIVE: 0
NEUROLOGICAL NEGATIVE: 0
CONSTITUTIONAL NEGATIVE: 0
HEMATOLOGIC/LYMPHATIC NEGATIVE: 0
RESPIRATORY NEGATIVE: 0
CARDIOVASCULAR NEGATIVE: 0
PSYCHIATRIC NEGATIVE: 0

## 2024-04-03 ASSESSMENT — EXTERNAL EXAM - LEFT EYE: OS_EXAM: NORMAL

## 2024-04-03 ASSESSMENT — CUP TO DISC RATIO
OD_RATIO: 0.3
OS_RATIO: 0.3

## 2024-04-03 ASSESSMENT — CONF VISUAL FIELD
OS_NORMAL: 1
OS_INFERIOR_TEMPORAL_RESTRICTION: 0
OD_SUPERIOR_NASAL_RESTRICTION: 0
OD_INFERIOR_TEMPORAL_RESTRICTION: 0
OS_SUPERIOR_NASAL_RESTRICTION: 0
OS_SUPERIOR_TEMPORAL_RESTRICTION: 0
OS_INFERIOR_NASAL_RESTRICTION: 0
OD_NORMAL: 1
OD_SUPERIOR_TEMPORAL_RESTRICTION: 0
OD_INFERIOR_NASAL_RESTRICTION: 0

## 2024-04-03 ASSESSMENT — PACHYMETRY
OS_CT(UM): 565
OD_CT(UM): 557

## 2024-04-03 ASSESSMENT — SLIT LAMP EXAM - LIDS
COMMENTS: GOOD POSITION
COMMENTS: GOOD POSITION

## 2024-04-03 ASSESSMENT — EXTERNAL EXAM - RIGHT EYE: OD_EXAM: NORMAL

## 2024-04-03 NOTE — PROGRESS NOTES
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    HVF 24-2 (4/19/23): WNL, no evidence of glaucoma  OCT, Optic Nerve - OU - Both Eyes          Right Eye  Images reviewed and comparison made to baseline. To assess optic nerve function and for use in future follow-up. Reliability: good and adequate.     Left Eye  Images reviewed and comparison made to baseline. To assess optic nerve function and for use in future follow-up. Reliability: good and adequate.     Notes  Robust and stable ou           had slt done on 7/2, no response   goal IOP under 21 OU, remains stable    cont vyzulta qhs OU, alphagan BID OU, cosopt BID OU   RTC 6 months with HVF 24-2, and IOP check      pseudophakiaOU   with -2/50 target   monitor

## 2024-04-04 ENCOUNTER — PATIENT OUTREACH (OUTPATIENT)
Dept: PRIMARY CARE | Facility: CLINIC | Age: 64
End: 2024-04-04
Payer: MEDICARE

## 2024-04-18 ENCOUNTER — LAB (OUTPATIENT)
Dept: LAB | Facility: LAB | Age: 64
End: 2024-04-18
Payer: MEDICARE

## 2024-04-18 ENCOUNTER — OFFICE VISIT (OUTPATIENT)
Dept: PRIMARY CARE | Facility: CLINIC | Age: 64
End: 2024-04-18
Payer: MEDICARE

## 2024-04-18 VITALS
WEIGHT: 269.8 LBS | OXYGEN SATURATION: 98 % | DIASTOLIC BLOOD PRESSURE: 78 MMHG | BODY MASS INDEX: 38.63 KG/M2 | SYSTOLIC BLOOD PRESSURE: 130 MMHG | RESPIRATION RATE: 18 BRPM | HEIGHT: 70 IN | HEART RATE: 91 BPM

## 2024-04-18 DIAGNOSIS — K21.9 GASTROESOPHAGEAL REFLUX DISEASE WITHOUT ESOPHAGITIS: ICD-10-CM

## 2024-04-18 DIAGNOSIS — E11.51 DIABETES MELLITUS TYPE 2 WITH PERIPHERAL ARTERY DISEASE (MULTI): Primary | ICD-10-CM

## 2024-04-18 DIAGNOSIS — I25.119 CORONARY ARTERY DISEASE INVOLVING NATIVE CORONARY ARTERY OF NATIVE HEART WITH ANGINA PECTORIS (CMS-HCC): ICD-10-CM

## 2024-04-18 DIAGNOSIS — E11.51 DIABETES MELLITUS TYPE 2 WITH PERIPHERAL ARTERY DISEASE (MULTI): ICD-10-CM

## 2024-04-18 DIAGNOSIS — I48.0 PAROXYSMAL ATRIAL FIBRILLATION (MULTI): ICD-10-CM

## 2024-04-18 DIAGNOSIS — I10 BENIGN ESSENTIAL HYPERTENSION: ICD-10-CM

## 2024-04-18 DIAGNOSIS — I25.10 ATHEROSCLEROSIS OF NATIVE CORONARY ARTERY OF NATIVE HEART WITHOUT ANGINA PECTORIS: ICD-10-CM

## 2024-04-18 DIAGNOSIS — N18.30 STAGE 3 CHRONIC KIDNEY DISEASE, UNSPECIFIED WHETHER STAGE 3A OR 3B CKD (MULTI): ICD-10-CM

## 2024-04-18 DIAGNOSIS — E78.00 HYPERCHOLESTEROLEMIA: ICD-10-CM

## 2024-04-18 PROBLEM — E66.01 OBESITY, CLASS III, BMI 40-49.9 (MORBID OBESITY) (MULTI): Status: RESOLVED | Noted: 2023-08-10 | Resolved: 2024-04-18

## 2024-04-18 PROBLEM — M79.602 PAIN OF LEFT UPPER EXTREMITY: Status: RESOLVED | Noted: 2023-08-31 | Resolved: 2024-04-18

## 2024-04-18 PROBLEM — R06.02 SOB (SHORTNESS OF BREATH) ON EXERTION: Status: RESOLVED | Noted: 2023-08-31 | Resolved: 2024-04-18

## 2024-04-18 PROBLEM — E11.9 DIABETES MELLITUS (MULTI): Status: RESOLVED | Noted: 2023-03-22 | Resolved: 2024-04-18

## 2024-04-18 PROBLEM — E78.2 MIXED HYPERLIPIDEMIA: Status: RESOLVED | Noted: 2023-08-31 | Resolved: 2024-04-18

## 2024-04-18 PROBLEM — E66.813 OBESITY, CLASS III, BMI 40-49.9 (MORBID OBESITY): Status: RESOLVED | Noted: 2023-08-10 | Resolved: 2024-04-18

## 2024-04-18 PROBLEM — R06.02 SHORTNESS OF BREATH: Status: RESOLVED | Noted: 2023-08-31 | Resolved: 2024-04-18

## 2024-04-18 PROBLEM — E66.9 OBESITY: Status: RESOLVED | Noted: 2023-08-31 | Resolved: 2024-04-18

## 2024-04-18 PROBLEM — E78.49 OTHER HYPERLIPIDEMIA: Status: RESOLVED | Noted: 2019-02-28 | Resolved: 2024-04-18

## 2024-04-18 LAB
ALBUMIN SERPL BCP-MCNC: 4.2 G/DL (ref 3.4–5)
ALP SERPL-CCNC: 70 U/L (ref 33–136)
ALT SERPL W P-5'-P-CCNC: 18 U/L (ref 10–52)
ANION GAP SERPL CALC-SCNC: 15 MMOL/L (ref 10–20)
AST SERPL W P-5'-P-CCNC: 17 U/L (ref 9–39)
BILIRUB SERPL-MCNC: 0.8 MG/DL (ref 0–1.2)
BUN SERPL-MCNC: 21 MG/DL (ref 6–23)
CALCIUM SERPL-MCNC: 9.4 MG/DL (ref 8.6–10.3)
CHLORIDE SERPL-SCNC: 103 MMOL/L (ref 98–107)
CHOLEST SERPL-MCNC: 99 MG/DL (ref 0–199)
CHOLESTEROL/HDL RATIO: 3.2
CO2 SERPL-SCNC: 28 MMOL/L (ref 21–32)
CREAT SERPL-MCNC: 1.17 MG/DL (ref 0.5–1.3)
EGFRCR SERPLBLD CKD-EPI 2021: 70 ML/MIN/1.73M*2
ERYTHROCYTE [DISTWIDTH] IN BLOOD BY AUTOMATED COUNT: 19.9 % (ref 11.5–14.5)
GLUCOSE SERPL-MCNC: 134 MG/DL (ref 74–99)
HCT VFR BLD AUTO: 45.2 % (ref 41–52)
HDLC SERPL-MCNC: 30.5 MG/DL
HGB BLD-MCNC: 12.7 G/DL (ref 13.5–17.5)
LDLC SERPL CALC-MCNC: 29 MG/DL
MCH RBC QN AUTO: 21.6 PG (ref 26–34)
MCHC RBC AUTO-ENTMCNC: 28.1 G/DL (ref 32–36)
MCV RBC AUTO: 77 FL (ref 80–100)
NON HDL CHOLESTEROL: 69 MG/DL (ref 0–149)
NRBC BLD-RTO: 0 /100 WBCS (ref 0–0)
PLATELET # BLD AUTO: 152 X10*3/UL (ref 150–450)
POTASSIUM SERPL-SCNC: 4.1 MMOL/L (ref 3.5–5.3)
PROT SERPL-MCNC: 7.2 G/DL (ref 6.4–8.2)
RBC # BLD AUTO: 5.89 X10*6/UL (ref 4.5–5.9)
SODIUM SERPL-SCNC: 142 MMOL/L (ref 136–145)
TRIGL SERPL-MCNC: 196 MG/DL (ref 0–149)
VLDL: 39 MG/DL (ref 0–40)
WBC # BLD AUTO: 9.8 X10*3/UL (ref 4.4–11.3)

## 2024-04-18 PROCEDURE — 3075F SYST BP GE 130 - 139MM HG: CPT | Performed by: NURSE PRACTITIONER

## 2024-04-18 PROCEDURE — 3008F BODY MASS INDEX DOCD: CPT | Performed by: NURSE PRACTITIONER

## 2024-04-18 PROCEDURE — 82570 ASSAY OF URINE CREATININE: CPT

## 2024-04-18 PROCEDURE — 36415 COLL VENOUS BLD VENIPUNCTURE: CPT

## 2024-04-18 PROCEDURE — 99214 OFFICE O/P EST MOD 30 MIN: CPT | Performed by: NURSE PRACTITIONER

## 2024-04-18 PROCEDURE — 80061 LIPID PANEL: CPT

## 2024-04-18 PROCEDURE — 82043 UR ALBUMIN QUANTITATIVE: CPT

## 2024-04-18 PROCEDURE — 1036F TOBACCO NON-USER: CPT | Performed by: NURSE PRACTITIONER

## 2024-04-18 PROCEDURE — 3078F DIAST BP <80 MM HG: CPT | Performed by: NURSE PRACTITIONER

## 2024-04-18 PROCEDURE — 85027 COMPLETE CBC AUTOMATED: CPT

## 2024-04-18 PROCEDURE — 80053 COMPREHEN METABOLIC PANEL: CPT

## 2024-04-18 RX ORDER — SACUBITRIL AND VALSARTAN 24; 26 MG/1; MG/1
1 TABLET, FILM COATED ORAL 2 TIMES DAILY
COMMUNITY
Start: 2024-03-06

## 2024-04-18 RX ORDER — ISOSORBIDE MONONITRATE 30 MG/1
30 TABLET, EXTENDED RELEASE ORAL DAILY
COMMUNITY
Start: 2024-03-19

## 2024-04-18 RX ORDER — PANTOPRAZOLE SODIUM 20 MG/1
20 TABLET, DELAYED RELEASE ORAL EVERY OTHER DAY
Qty: 15 TABLET | Refills: 1 | Status: SHIPPED | OUTPATIENT
Start: 2024-04-18 | End: 2024-05-29

## 2024-04-18 RX ORDER — NITROGLYCERIN 0.4 MG/1
0.4 TABLET SUBLINGUAL AS NEEDED
COMMUNITY
Start: 2024-01-17

## 2024-04-18 RX ORDER — CARVEDILOL 12.5 MG/1
12.5 TABLET ORAL
Qty: 180 TABLET | Refills: 3 | Status: SHIPPED | OUTPATIENT
Start: 2024-04-18

## 2024-04-18 NOTE — ASSESSMENT & PLAN NOTE
Recently had [facility] 3/19/2024  Continue aspirin 81 mg daily  Continue atorvastatin 80 mg daily  Continue carvedilol 12.5 mg twice a day  Continue Entresto 24-26 as per cardiology  Continue isosorbide mononitrate ER 30 mg once a day  Continue Jardiance 25 mg daily  Continue metoprolol succinate XL 25 mg once a day continue torsemide 20 mg daily as per cardiology  CBC, CMP, lipid panel  Follow-up with cardiology as scheduled  Follow-up with endocrinology as scheduled

## 2024-04-18 NOTE — ASSESSMENT & PLAN NOTE
Recently had [facility] 3/19/2024  Continue aspirin 81 mg daily  Continue atorvastatin 80 mg daily  Continue carvedilol 12.5 mg twice a day  Continue Entresto 24-26 as per cardiology  Continue isosorbide mononitrate ER 30 mg once a day  Continue Jardiance 25 mg daily  Continue metoprolol succinate XL 25 mg once a day continue torsemide 20 mg daily as per cardiology  CBC, CMP, lipid panel  Follow-up with cardiology as scheduled  Follow-up with endocrinology as scheduled

## 2024-04-18 NOTE — ASSESSMENT & PLAN NOTE
Recently had [facility] 3/19/2024  Continue aspirin 81 mg daily  Continue atorvastatin 80 mg daily  Continue carvedilol 12.5 mg twice a day  Continue Entresto 24-26 as per cardiology  Continue isosorbide mononitrate ER 30 mg once a day  Continue Jardiance 25 mg daily  Continue metoprolol succinate XL 25 mg once a day continue torsemide 20 mg daily as per cardiology  CBC, CMP, lipid panel  Follow-up with cardiology as scheduled  Follow-up with endocrinology as scheduled

## 2024-04-18 NOTE — ASSESSMENT & PLAN NOTE
Recently had [facility] 3/19/2024  Continue aspirin 81 mg daily  Continue atorvastatin 80 mg daily  Continue carvedilol 12.5 mg twice a day  Continue Entresto 24-26 as per cardiology  Continue isosorbide mononitrate ER 30 mg once a day  Continue Jardiance 25 mg daily  Continue metoprolol succinate XL 25 mg once a day continue torsemide 20 mg daily as per cardiology  CBC, CMP, lipid panel  Follow-up with cardiology as scheduled  Follow-up with endocrinology as scheduled  PTCA 2011  CABG 2019

## 2024-04-18 NOTE — ASSESSMENT & PLAN NOTE
"Has been on pantoprazole 20 mg for \"years  Has not tried to stop pantoprazole  And we will try to wean him off of that he will start by taking pantoprazole 20 mg every other day for 2 months and then stop  "

## 2024-04-18 NOTE — PROGRESS NOTES
"Subjective   Patient ID: Tito Christian is a 63 y.o. male who presents for Diabetes (Tito is in today for his routine F/U states that he will see Endo next week for A1C check. He would like to discuss chest discomfort/tightness x3 weeks. ).    Presents for follow up for DM he is following endocrinology and will be following up next week he regularly follows up with the eye doctor    Diabetes Mellitus  Patient presents for follow up of diabetes. Current symptoms include: none. Symptoms have been well-controlled. Patient denies nausea, polydipsia, and polyuria. Evaluation to date has included: fasting blood sugar.  Home sugars: BGs consistently in an acceptable range. Current treatment: none. Last dilated eye exam: 1 week ago.    Had heart catherization on 3/19/24 at [place].  Losartan was changed and placed on entresto as he did have a decrease in his EF to 37%  Does have chest 'tightness' in mid chest and rates 1/10.  Denies shortness of breath.  Pain does not radiate has had this ongoing chest pain for more than 6 months      Diabetes         Review of Systems   All other systems reviewed and are negative.      Objective   /78   Pulse 91   Resp 18   Ht 1.778 m (5' 10\")   Wt 122 kg (269 lb 12.8 oz)   SpO2 98%   BMI 38.71 kg/m²     Physical Exam  Vitals and nursing note reviewed.   Constitutional:       General: He is not in acute distress.     Appearance: Normal appearance.   HENT:      Head: Normocephalic and atraumatic.      Right Ear: External ear normal.      Left Ear: External ear normal.      Mouth/Throat:      Mouth: Mucous membranes are moist.      Pharynx: Oropharynx is clear.   Eyes:      Extraocular Movements: Extraocular movements intact.      Conjunctiva/sclera: Conjunctivae normal.      Pupils: Pupils are equal, round, and reactive to light.   Neck:      Vascular: No carotid bruit.   Cardiovascular:      Rate and Rhythm: Normal rate and regular rhythm.      Pulses: Normal pulses.    "   Heart sounds: Normal heart sounds.   Pulmonary:      Effort: Pulmonary effort is normal.      Breath sounds: Normal breath sounds.   Lymphadenopathy:      Cervical: No cervical adenopathy.   Skin:     General: Skin is warm and dry.   Neurological:      Mental Status: He is alert and oriented to person, place, and time. Mental status is at baseline.   Psychiatric:         Mood and Affect: Mood normal.         Behavior: Behavior normal.         Thought Content: Thought content normal.         Judgment: Judgment normal.         Assessment/Plan   Problem List Items Addressed This Visit             ICD-10-CM    Atherosclerotic heart disease of native coronary artery without angina pectoris I25.10     Recently had [facility] 3/19/2024  Continue aspirin 81 mg daily  Continue atorvastatin 80 mg daily  Continue carvedilol 12.5 mg twice a day  Continue Entresto 24-26 as per cardiology  Continue isosorbide mononitrate ER 30 mg once a day  Continue Jardiance 25 mg daily  Continue metoprolol succinate XL 25 mg once a day continue torsemide 20 mg daily as per cardiology  CBC, CMP, lipid panel  Follow-up with cardiology as scheduled  Follow-up with endocrinology as scheduled         Relevant Medications    Entresto 24-26 mg tablet    isosorbide mononitrate ER (Imdur) 30 mg 24 hr tablet    nitroglycerin (Nitrostat) 0.4 mg SL tablet    carvedilol (Coreg) 12.5 mg tablet    Benign essential hypertension I10     Recently had [facility] 3/19/2024  Continue aspirin 81 mg daily  Continue atorvastatin 80 mg daily  Continue carvedilol 12.5 mg twice a day  Continue Entresto 24-26 as per cardiology  Continue isosorbide mononitrate ER 30 mg once a day  Continue Jardiance 25 mg daily  Continue metoprolol succinate XL 25 mg once a day continue torsemide 20 mg daily as per cardiology  CBC, CMP, lipid panel  Follow-up with cardiology as scheduled  Follow-up with endocrinology as scheduled         Relevant Medications    carvedilol (Coreg) 12.5 mg tablet     Other Relevant Orders    CBC    Comprehensive Metabolic Panel    Chronic kidney disease, stage 3 (Multi) N18.30     Follows endocrinology and will follow-up next week  CBC, CMP, lipid panel, microalbumin  Recently had [facility] 3/19/2024  Continue aspirin 81 mg daily  Continue atorvastatin 80 mg daily  Continue carvedilol 12.5 mg twice a day  Continue Entresto 24-26 as per cardiology  Continue isosorbide mononitrate ER 30 mg once a day  Continue Jardiance 25 mg daily  Continue Humalog as per endocrinology  Continue metoprolol succinate XL 25 mg once a day continue torsemide 20 mg daily as per cardiology  CBC, CMP, lipid panel  Follow-up with cardiology as scheduled  Follow-up with endocrinology as scheduled  PTCA 2011  CABG 2019         Hypercholesterolemia E78.00     Recently had [facility] 3/19/2024  Continue aspirin 81 mg daily  Continue atorvastatin 80 mg daily  Continue carvedilol 12.5 mg twice a day  Continue Entresto 24-26 as per cardiology  Continue isosorbide mononitrate ER 30 mg once a day  Continue Jardiance 25 mg daily  Continue metoprolol succinate XL 25 mg once a day continue torsemide 20 mg daily as per cardiology  CBC, CMP, lipid panel  Follow-up with cardiology as scheduled  Follow-up with endocrinology as scheduled         Paroxysmal atrial fibrillation (Multi) I48.0     Follows endocrinology and will follow-up next week  CBC, CMP, lipid panel, microalbumin  Recently had [facility] 3/19/2024  Continue aspirin 81 mg daily  Continue atorvastatin 80 mg daily  Continue carvedilol 12.5 mg twice a day  Continue Entresto 24-26 as per cardiology  Continue isosorbide mononitrate ER 30 mg once a day  Continue Jardiance 25 mg daily  Continue Humalog as per endocrinology  Continue metoprolol succinate XL 25 mg once a day continue torsemide 20 mg daily as per cardiology  CBC, CMP, lipid panel  Follow-up with cardiology as scheduled  Follow-up with endocrinology as scheduled  PTCA 2011  CABG 2019         Relevant Medications     "Entresto 24-26 mg tablet    isosorbide mononitrate ER (Imdur) 30 mg 24 hr tablet    nitroglycerin (Nitrostat) 0.4 mg SL tablet    carvedilol (Coreg) 12.5 mg tablet    Coronary artery disease involving native coronary artery of native heart with angina pectoris (CMS-HCC) I25.119     Recently had [facility] 3/19/2024  Continue aspirin 81 mg daily  Continue atorvastatin 80 mg daily  Continue carvedilol 12.5 mg twice a day  Continue Entresto 24-26 as per cardiology  Continue isosorbide mononitrate ER 30 mg once a day  Continue Jardiance 25 mg daily  Continue metoprolol succinate XL 25 mg once a day continue torsemide 20 mg daily as per cardiology  CBC, CMP, lipid panel  Follow-up with cardiology as scheduled  Follow-up with endocrinology as scheduled  PTCA 2011  CABG 2019         Relevant Medications    Entresto 24-26 mg tablet    isosorbide mononitrate ER (Imdur) 30 mg 24 hr tablet    nitroglycerin (Nitrostat) 0.4 mg SL tablet    carvedilol (Coreg) 12.5 mg tablet    Diabetes mellitus type 2 with peripheral artery disease (Multi) - Primary E11.51     Follows endocrinology and will follow-up next week  CBC, CMP, lipid panel, microalbumin  Recently had [facility] 3/19/2024  Continue aspirin 81 mg daily  Continue atorvastatin 80 mg daily  Continue carvedilol 12.5 mg twice a day  Continue Entresto 24-26 as per cardiology  Continue isosorbide mononitrate ER 30 mg once a day  Continue Jardiance 25 mg daily  Continue Humalog as per endocrinology  Continue metoprolol succinate XL 25 mg once a day continue torsemide 20 mg daily as per cardiology  CBC, CMP, lipid panel  Follow-up with cardiology as scheduled  Follow-up with endocrinology as scheduled  PTCA 2011  CABG 2019         Relevant Orders    Lipid Panel    Albumin , Urine Random    Gastroesophageal reflux disease without esophagitis K21.9     Has been on pantoprazole 20 mg for \"years  Has not tried to stop pantoprazole  And we will try to wean him off of that he will start by taking " pantoprazole 20 mg every other day for 2 months and then stop         Relevant Medications    pantoprazole (ProtoNix) 20 mg EC tablet

## 2024-04-18 NOTE — PATIENT INSTRUCTIONS
Decrease your pantoprazole to every OTHER day for 2 months and then stop  Follow up with Dr. Segura as scheduled  I ordered labs for you - have done before you see Dr. Segura  Follow up in 6 months

## 2024-04-18 NOTE — ASSESSMENT & PLAN NOTE
Follows endocrinology and will follow-up next week  CBC, CMP, lipid panel, microalbumin  Recently had [facility] 3/19/2024  Continue aspirin 81 mg daily  Continue atorvastatin 80 mg daily  Continue carvedilol 12.5 mg twice a day  Continue Entresto 24-26 as per cardiology  Continue isosorbide mononitrate ER 30 mg once a day  Continue Jardiance 25 mg daily  Continue Humalog as per endocrinology  Continue metoprolol succinate XL 25 mg once a day continue torsemide 20 mg daily as per cardiology  CBC, CMP, lipid panel  Follow-up with cardiology as scheduled  Follow-up with endocrinology as scheduled  PTCA 2011  CABG 2019

## 2024-04-18 NOTE — ASSESSMENT & PLAN NOTE
Follows endocrinology and will follow-up next week  CBC, CMP, lipid panel, microalbumin  Recently had [facility] 3/19/2024  Continue aspirin 81 mg daily  Continue atorvastatin 80 mg daily  Continue carvedilol 12.5 mg twice a day  Continue Entresto 24-26 as per cardiology  Continue isosorbide mononitrate ER 30 mg once a day  Continue Jardiance 25 mg daily  Continue Humalog as per endocrinology  Continue metoprolol succinate XL 25 mg once a day continue torsemide 20 mg daily as per cardiology  CBC, CMP, lipid panel  Follow-up with cardiology as scheduled  Follow-up with endocrinology as scheduled  PTCA 2011  CABG 2019

## 2024-04-18 NOTE — ASSESSMENT & PLAN NOTE
Follows endocrinology and will follow-up next week  CBC, CMP, lipid panel, microalbumin  Recently had [facility] 3/19/2024  Continue aspirin 81 mg daily  Continue atorvastatin 80 mg daily  Continue carvedilol 12.5 mg twice a day  Continue Entresto 24-26 as per cardiology  Continue isosorbide mononitrate ER 30 mg once a day  Continue Jardiance 25 mg daily  Continue Humalog as per endocrinology  Continue metoprolol succinate XL 25 mg once a day continue torsemide 20 mg daily as per cardiology  CBC, CMP, lipid panel  Follow-up with cardiology as scheduled  Follow-up with endocrinology as scheduled  PTCA 2011  CABG 2019

## 2024-04-19 LAB
CREAT UR-MCNC: 74.1 MG/DL (ref 20–370)
MICROALBUMIN UR-MCNC: 25 MG/L
MICROALBUMIN/CREAT UR: 33.7 UG/MG CREAT

## 2024-04-22 ENCOUNTER — OFFICE VISIT (OUTPATIENT)
Dept: ENDOCRINOLOGY | Facility: CLINIC | Age: 64
End: 2024-04-22
Payer: MEDICARE

## 2024-04-22 VITALS
BODY MASS INDEX: 38.74 KG/M2 | SYSTOLIC BLOOD PRESSURE: 100 MMHG | HEART RATE: 71 BPM | WEIGHT: 270 LBS | DIASTOLIC BLOOD PRESSURE: 65 MMHG

## 2024-04-22 DIAGNOSIS — Z79.4 TYPE 2 DIABETES MELLITUS WITHOUT COMPLICATION, WITH LONG-TERM CURRENT USE OF INSULIN (MULTI): Primary | ICD-10-CM

## 2024-04-22 DIAGNOSIS — E11.9 TYPE 2 DIABETES MELLITUS WITHOUT COMPLICATION, WITH LONG-TERM CURRENT USE OF INSULIN (MULTI): Primary | ICD-10-CM

## 2024-04-22 LAB — POC HEMOGLOBIN A1C: 7.2 % (ref 4.2–6.5)

## 2024-04-22 PROCEDURE — 3074F SYST BP LT 130 MM HG: CPT | Performed by: INTERNAL MEDICINE

## 2024-04-22 PROCEDURE — 3078F DIAST BP <80 MM HG: CPT | Performed by: INTERNAL MEDICINE

## 2024-04-22 PROCEDURE — 83036 HEMOGLOBIN GLYCOSYLATED A1C: CPT | Performed by: INTERNAL MEDICINE

## 2024-04-22 PROCEDURE — 3008F BODY MASS INDEX DOCD: CPT | Performed by: INTERNAL MEDICINE

## 2024-04-22 PROCEDURE — 3061F NEG MICROALBUMINURIA REV: CPT | Performed by: INTERNAL MEDICINE

## 2024-04-22 PROCEDURE — 99214 OFFICE O/P EST MOD 30 MIN: CPT | Performed by: INTERNAL MEDICINE

## 2024-04-22 PROCEDURE — 3048F LDL-C <100 MG/DL: CPT | Performed by: INTERNAL MEDICINE

## 2024-04-22 NOTE — PROGRESS NOTES
History Of Present Illness  Tito Christian is a 63 y.o. male     Duration of type 2 diabetes mellitus:  15 years  Complications:  cardiovascular disease     CHF  Started Entresto, off losartan    Lantus 46 units at bedtime  Humalog 20 units before meals.  Add scale (2 units for every 50 over glucose 100 mg/dl)     Metformin 1000 mg BID.   Jardiance 25 mg/day     Patient is testing glucose 3 times daily  Records reviewed, on file     Last eye exam:  4/3/24    Past Medical History  He has a past medical history of Acute postoperative pain (03/01/2019), Bradycardia (08/10/2023), Cataract extraction status (08/10/2023), Chest pain (08/10/2023), CHF (congestive heart failure) (Multi) (08/10/2023), Cortical age-related cataract, bilateral (08/21/2019), Diabetes mellitus (Multi) (03/22/2023), Edema of throat (02/28/2019), Essential hypertension (08/31/2023), Gout (08/10/2023), Heart block (06/30/2023), HTN (hypertension) (02/28/2019), Intermittent complete atrioventricular block (Multi) (08/10/2023), Left knee pain (08/10/2023), Mixed hyperlipidemia (08/31/2023), Mobitz type 1 second degree AV block (07/03/2023), Obesity (08/31/2023), Obesity, Class III, BMI 40-49.9 (morbid obesity) (Multi) (08/10/2023), Other hyperlipidemia (02/28/2019), Pain of left upper extremity (08/31/2023), Preglaucoma, unspecified, 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    Surgical History  He has a past surgical history that includes Colonoscopy (09/23/2014); Other surgical history (10/02/2019); and Other surgical history (08/03/2020).     Social History  He reports that he has never smoked. He has never used smokeless tobacco. He reports that he does not drink alcohol and does not use drugs.    Family  "History  Family History   Problem Relation Name Age of Onset    Other (cardiac disorder) Father      Other (cardiac disorder) Other Uncle        Medications  Current Outpatient Medications   Medication Instructions    aspirin 81 mg, oral, Once    atorvastatin (LIPITOR) 80 mg, oral, Daily    brimonidine (AlphaGAN P) 0.2 % ophthalmic solution ophthalmic (eye), 2 times daily    carvedilol (COREG) 12.5 mg, oral, 2 times daily with meals    dorzolamide-timoloL (Cosopt) 22.3-6.8 mg/mL ophthalmic solution 1 drop, ophthalmic (eye), 2 times daily    Entresto 24-26 mg tablet 1 tablet, oral, 2 times daily    insulin glargine (LANTUS SOLOSTAR U-100 INSULIN) 46 Units, subcutaneous, Daily    insulin lispro (HumaLOG KwikPen Insulin) 100 unit/mL injection subcutaneous    isosorbide mononitrate ER (IMDUR) 30 mg, oral, Daily    Jardiance 25 mg, oral, Daily    lancets 30 gauge misc     lancing device/lancets (ACCU-CHEK MULTICLIX LANCET MISC) miscellaneous, Daily    latanoprost (Xalatan) 0.005 % ophthalmic solution 1 drop, Nightly    latanoprostene bunod (Vyzulta) 0.024 % drops ophthalmic (eye)    metFORMIN (GLUCOPHAGE) 500 mg, oral, 2 times daily with meals    metOLazone (Zaroxolyn) 2.5 mg tablet oral    metoprolol succinate XL (Toprol-XL) 25 mg 24 hr tablet oral    nitroglycerin (NITROSTAT) 0.4 mg, sublingual, As needed    OneTouch Ultra Test strip USE TO TEST 3 TIMES DAILY    pantoprazole (PROTONIX) 20 mg, oral, Every other day, Do not crush, chew, or split.    pen needle, diabetic 31 gauge x 3/16\" needle miscellaneous, 4 times daily    potassium chloride CR 20 mEq ER tablet 20 mEq, oral, Daily RT    tamsulosin (FLOMAX) 0.4 mg, oral, 2 times daily    therapeutic multivitamin-iron-minerals (Theragran-M) tablet 1 tablet, oral, Daily with breakfast    torsemide (Demadex) 20 mg tablet oral       Allergies  Patient has no known allergies.    Review of Systems   Constitutional:  Negative for appetite change and fever.   HENT:  Negative " for sore throat.         Dry mouth   Eyes:  Negative for visual disturbance.   Respiratory:  Positive for apnea (sleep). Negative for cough and shortness of breath.    Cardiovascular:  Positive for chest pain (not currently).   Gastrointestinal:  Negative for abdominal pain, constipation, diarrhea, nausea and vomiting.   Endocrine: Negative for polydipsia and polyuria.   Genitourinary:  Negative for frequency.        Prostatism   Musculoskeletal:  Positive for neck pain. Negative for arthralgias.   Skin:  Negative for rash.   Neurological:  Positive for numbness. Negative for headaches.   Psychiatric/Behavioral:  The patient is not nervous/anxious.          Last Recorded Vitals  Blood pressure 100/65, pulse 71, weight 122 kg (270 lb).    Physical Exam  Constitutional:       General: He is not in acute distress.  HENT:      Head: Normocephalic.      Mouth/Throat:      Mouth: Mucous membranes are moist.   Eyes:      Extraocular Movements: Extraocular movements intact.   Neck:      Thyroid: No thyroid mass or thyromegaly.   Cardiovascular:      Pulses:           Radial pulses are 2+ on the right side and 2+ on the left side.   Musculoskeletal:      Right lower leg: No edema.      Left lower leg: No edema.   Neurological:      Mental Status: He is alert.      Motor: No tremor.   Psychiatric:         Mood and Affect: Affect normal.          Relevant Results  Glucose   Date Value   04/18/2024 134 mg/dL (H)   08/09/2023 115 mg/dL (H)   06/30/2023 CANCELED   06/29/2023 86 mg/dL     POC HEMOGLOBIN A1c (%)   Date Value   10/16/2023 6.2     Hemoglobin A1C (%)   Date Value   07/03/2023 7.0 (H)   06/27/2023 7.0 (A)   11/09/2021 7.9 (H)   03/24/2021 7.9 (H)     Bicarbonate   Date Value   04/18/2024 28 mmol/L   08/09/2023 27 mmol/L   06/30/2023 CANCELED   06/29/2023 28 mmol/L     Urea Nitrogen   Date Value   04/18/2024 21 mg/dL   08/09/2023 17 mg/dL   06/30/2023 CANCELED   06/29/2023 17 mg/dL     Creatinine   Date Value    04/18/2024 1.17 mg/dL   08/09/2023 1.14 mg/dL   06/30/2023 CANCELED   06/29/2023 1.15 mg/dL     Lab Results   Component Value Date    CHOL 99 04/18/2024    CHOL 118 02/14/2023    CHOL 91 (L) 05/05/2020     Lab Results   Component Value Date    HDL 30.5 04/18/2024    HDL 37.5 (A) 02/14/2023    HDL 31 (L) 05/05/2020     Lab Results   Component Value Date    LDLCALC 29 04/18/2024    LDLCALC 25 (L) 05/05/2020    LDLCALC 82 10/18/2018     Lab Results   Component Value Date    TRIG 196 (H) 04/18/2024    TRIG 121 02/14/2023    TRIG 173 (H) 05/05/2020     Lab Results   Component Value Date    TSH 4.37 (H) 06/29/2023     Lab Results   Component Value Date    ALBUR 12 01/14/2019          IMPRESSION  TYPE 2 DIABETES MELLITUS  LONG TERM CURRENT INSULIN USE   Rapid A1c 7.2%      RECOMMENDATIONS  Continue current insulin program  Continue metformin and Jardiance    Follow up 4 months  A1c at next appointment if not already done  Labs as ordered by GERARD Castro.

## 2024-04-22 NOTE — PATIENT INSTRUCTIONS
A1c 7.2%    RECOMMENDATIONS  Continue current insulin program  Continue metformin and Jardiance    Follow up 4 months  A1c at next appointment if not already done  Labs as ordered by GERARD Castro.

## 2024-05-13 ENCOUNTER — APPOINTMENT (OUTPATIENT)
Dept: UROLOGY | Facility: CLINIC | Age: 64
End: 2024-05-13
Payer: MEDICARE

## 2024-05-20 ENCOUNTER — LAB (OUTPATIENT)
Dept: LAB | Facility: LAB | Age: 64
End: 2024-05-20
Payer: MEDICARE

## 2024-05-20 DIAGNOSIS — R97.20 ELEVATED PSA: ICD-10-CM

## 2024-05-20 PROCEDURE — 36415 COLL VENOUS BLD VENIPUNCTURE: CPT

## 2024-05-20 PROCEDURE — 84153 ASSAY OF PSA TOTAL: CPT

## 2024-05-21 LAB — PSA SERPL-MCNC: 3.96 NG/ML

## 2024-05-27 ASSESSMENT — ENCOUNTER SYMPTOMS
DIARRHEA: 0
NAUSEA: 0
NERVOUS/ANXIOUS: 0
POLYDIPSIA: 0
SHORTNESS OF BREATH: 0
NECK PAIN: 1
FEVER: 0
COUGH: 0
CONSTIPATION: 0
APNEA: 1
APPETITE CHANGE: 0
ARTHRALGIAS: 0
ABDOMINAL PAIN: 0
FREQUENCY: 0
NUMBNESS: 1
HEADACHES: 0
VOMITING: 0
SORE THROAT: 0

## 2024-05-29 DIAGNOSIS — K21.9 GASTROESOPHAGEAL REFLUX DISEASE WITHOUT ESOPHAGITIS: ICD-10-CM

## 2024-05-29 RX ORDER — PANTOPRAZOLE SODIUM 20 MG/1
TABLET, DELAYED RELEASE ORAL
Qty: 45 TABLET | Refills: 1 | Status: SHIPPED | OUTPATIENT
Start: 2024-05-29

## 2024-05-30 ENCOUNTER — OFFICE VISIT (OUTPATIENT)
Dept: UROLOGY | Facility: CLINIC | Age: 64
End: 2024-05-30
Payer: MEDICARE

## 2024-05-30 DIAGNOSIS — N13.8 BPH WITH OBSTRUCTION/LOWER URINARY TRACT SYMPTOMS: ICD-10-CM

## 2024-05-30 DIAGNOSIS — R21 GROIN RASH: ICD-10-CM

## 2024-05-30 DIAGNOSIS — N40.1 BPH WITH OBSTRUCTION/LOWER URINARY TRACT SYMPTOMS: ICD-10-CM

## 2024-05-30 PROCEDURE — 3061F NEG MICROALBUMINURIA REV: CPT | Performed by: STUDENT IN AN ORGANIZED HEALTH CARE EDUCATION/TRAINING PROGRAM

## 2024-05-30 PROCEDURE — 3008F BODY MASS INDEX DOCD: CPT | Performed by: STUDENT IN AN ORGANIZED HEALTH CARE EDUCATION/TRAINING PROGRAM

## 2024-05-30 PROCEDURE — 3048F LDL-C <100 MG/DL: CPT | Performed by: STUDENT IN AN ORGANIZED HEALTH CARE EDUCATION/TRAINING PROGRAM

## 2024-05-30 PROCEDURE — G2211 COMPLEX E/M VISIT ADD ON: HCPCS | Performed by: STUDENT IN AN ORGANIZED HEALTH CARE EDUCATION/TRAINING PROGRAM

## 2024-05-30 PROCEDURE — 99214 OFFICE O/P EST MOD 30 MIN: CPT | Performed by: STUDENT IN AN ORGANIZED HEALTH CARE EDUCATION/TRAINING PROGRAM

## 2024-05-30 RX ORDER — TAMSULOSIN HYDROCHLORIDE 0.4 MG/1
0.8 CAPSULE ORAL DAILY
Qty: 180 CAPSULE | Refills: 3 | Status: SHIPPED | OUTPATIENT
Start: 2024-05-30 | End: 2025-05-30

## 2024-05-30 RX ORDER — PRENATAL VIT 91/IRON/FOLIC/DHA 28-975-200
COMBINATION PACKAGE (EA) ORAL 3 TIMES DAILY
Qty: 28.4 G | Refills: 0 | Status: SHIPPED | OUTPATIENT
Start: 2024-05-30 | End: 2024-06-29

## 2024-05-30 NOTE — PROGRESS NOTES
Subjective   Patient ID: Tito Christian is a 63 y.o. male for a follow up visit.    HPI    63 y.o. male for a follow up visit.  He has BPH and his last PSA is 3.58 on 2nd November 2023.    He was hospitalized in 07/2023 for cardiology issues,   had a pacemaker placed.     Taking Tamsulosin double dose and has seen urinary symptom improvement. Satisfied with this medication.  No complaints or worsening symptoms.      Review of Systems  All other systems reviewed and are negative.     Labs:    Component  Ref Range & Units 3 wk ago   Glucose  74 - 99 mg/dL 163 High       BUN  9 - 24 mg/dL 21   Creatinine  0.73 - 1.22 mg/dL 1.12   Sodium  136 - 144 mmol/L 139   Potassium  3.7 - 5.1 mmol/L 4.1   Chloride  97 - 105 mmol/L 104   CO2  22 - 30 mmol/L 27   Anion Gap  9 - 18 mmol/L 8 Low    Calcium, Total  8.5 - 10.2 mg/dL 9.7   Estimated Glomerular Filtration Rate  >=60 mL/min/1.73m² 73       Objective   Physical Exam    General: Well developed, well nourished, alert and cooperative, appears in no acute distress  Eyes: Non-injected conjunctiva, sclera clear, no proptosis  Cardiac: Extremities are warm and well perfused. No edema, cyanosis or pallor.   Lungs: Breathing is easy, non-labored. Speaking in clear and complete sentences. Normal diaphragmatic movement.  Abdomen: soft, non-tender  MSK: Ambulatory with steady gait, unassisted  Neuro: alert and oriented to person, place and time  Psych: Demonstrates good judgement and reason, without hallucinations, abnormal affect or abnormal behaviors.  Skin: no obvious lesions, Bilateral groin Fungal infection+    Assessment/Plan   Diagnoses and all orders for this visit:  Groin rash  -     terbinafine (LamISIL AT) 1 % cream; Apply topically 3 times a day.  BPH with obstruction/lower urinary tract symptoms  -     tamsulosin (Flomax) 0.4 mg 24 hr capsule; Take 2 capsules (0.8 mg) by mouth once daily.    - Continue Tamsulosin 0.4mg x2     Fungal Infection Bilateral groin:  -  Terbinafine for local application Bilateral groin tid for 3 weeks    FU in 1 month      Scribe Attestation  By signing my name below, I, Carlos Panda   attest that this documentation has been prepared under the direction and in the presence of Migue Collier MD.

## 2024-07-08 ENCOUNTER — APPOINTMENT (OUTPATIENT)
Dept: UROLOGY | Facility: CLINIC | Age: 64
End: 2024-07-08
Payer: MEDICARE

## 2024-07-08 DIAGNOSIS — R21 GROIN RASH: ICD-10-CM

## 2024-07-08 DIAGNOSIS — N40.1 BENIGN PROSTATIC HYPERPLASIA WITH WEAK URINARY STREAM: Primary | ICD-10-CM

## 2024-07-08 DIAGNOSIS — N13.8 BPH WITH OBSTRUCTION/LOWER URINARY TRACT SYMPTOMS: ICD-10-CM

## 2024-07-08 DIAGNOSIS — R97.20 ELEVATED PSA: ICD-10-CM

## 2024-07-08 DIAGNOSIS — N40.1 BPH WITH OBSTRUCTION/LOWER URINARY TRACT SYMPTOMS: ICD-10-CM

## 2024-07-08 DIAGNOSIS — R39.12 BENIGN PROSTATIC HYPERPLASIA WITH WEAK URINARY STREAM: Primary | ICD-10-CM

## 2024-07-08 PROCEDURE — 3061F NEG MICROALBUMINURIA REV: CPT | Performed by: STUDENT IN AN ORGANIZED HEALTH CARE EDUCATION/TRAINING PROGRAM

## 2024-07-08 PROCEDURE — G2211 COMPLEX E/M VISIT ADD ON: HCPCS | Performed by: STUDENT IN AN ORGANIZED HEALTH CARE EDUCATION/TRAINING PROGRAM

## 2024-07-08 PROCEDURE — 3048F LDL-C <100 MG/DL: CPT | Performed by: STUDENT IN AN ORGANIZED HEALTH CARE EDUCATION/TRAINING PROGRAM

## 2024-07-08 PROCEDURE — 99214 OFFICE O/P EST MOD 30 MIN: CPT | Performed by: STUDENT IN AN ORGANIZED HEALTH CARE EDUCATION/TRAINING PROGRAM

## 2024-07-08 RX ORDER — TAMSULOSIN HYDROCHLORIDE 0.4 MG/1
0.8 CAPSULE ORAL DAILY
Qty: 180 CAPSULE | Refills: 3 | Status: SHIPPED | OUTPATIENT
Start: 2024-07-08 | End: 2025-07-08

## 2024-07-08 NOTE — PROGRESS NOTES
Subjective   Patient ID: Tito Christian is a 63 y.o. male for a follow up visit.    HPI    63 y.o. male for a follow up visit.  He has BPH and his last PSA is 3.96 on 05/20/24.    His rash has resolved. He is still applying the cream daily.      Taking Tamsulosin double dose and has seen urinary symptom improvement. Continues to be satisfied with his symptoms. No complaints or worsening symptoms.      Review of Systems  All other systems reviewed and are negative.     Labs:    Component  Ref Range & Units 3 wk ago   Glucose  74 - 99 mg/dL 163 High       BUN  9 - 24 mg/dL 21   Creatinine  0.73 - 1.22 mg/dL 1.12   Sodium  136 - 144 mmol/L 139   Potassium  3.7 - 5.1 mmol/L 4.1   Chloride  97 - 105 mmol/L 104   CO2  22 - 30 mmol/L 27   Anion Gap  9 - 18 mmol/L 8 Low    Calcium, Total  8.5 - 10.2 mg/dL 9.7   Estimated Glomerular Filtration Rate  >=60 mL/min/1.73m² 73     Lab Results   Component Value Date    PSA 3.96 05/20/2024    PSA 3.58 11/02/2023    PSA 4.4 (H) 04/10/2023    PSA 3.0 04/11/2022          Objective   Physical Exam    General: Well developed, well nourished, alert and cooperative, appears in no acute distress  Eyes: Non-injected conjunctiva, sclera clear, no proptosis  Cardiac: Extremities are warm and well perfused. No edema, cyanosis or pallor.   Lungs: Breathing is easy, non-labored. Speaking in clear and complete sentences. Normal diaphragmatic movement.  Abdomen: soft, non-tender  MSK: Ambulatory with steady gait, unassisted  Neuro: alert and oriented to person, place and time  Psych: Demonstrates good judgement and reason, without hallucinations, abnormal affect or abnormal behaviors.  Skin: no obvious lesions, Bilateral groin Fungal infection improved, near complete resolution    Assessment/Plan   Diagnoses and all orders for this visit:  Benign prostatic hyperplasia with weak urinary stream  Groin rash  Elevated PSA  -     Prostate Specific Antigen; Future  BPH with obstruction/lower urinary  tract symptoms  -     tamsulosin (Flomax) 0.4 mg 24 hr capsule; Take 2 capsules (0.8 mg) by mouth once daily.        Plan:  Continue Tamsulosin 0.4mg x2   Continue Terbinafine until groin rash is gone  Ordered PSA free and total   Follow up after PSA.       Scribe Attestation  By signing my name below, IJanie Scribe   attest that this documentation has been prepared under the direction and in the presence of Migue Collier MD.

## 2024-07-08 NOTE — PROGRESS NOTES
Subjective   Patient ID: Tito Christian is a 63 y.o. male.    HPI    Review of Systems    Objective   Physical Exam    Assessment/Plan   There are no diagnoses linked to this encounter.

## 2024-07-12 ENCOUNTER — LAB (OUTPATIENT)
Dept: LAB | Facility: LAB | Age: 64
End: 2024-07-12
Payer: MEDICARE

## 2024-07-12 DIAGNOSIS — R39.12 BENIGN PROSTATIC HYPERPLASIA WITH WEAK URINARY STREAM: ICD-10-CM

## 2024-07-12 DIAGNOSIS — N40.1 BENIGN PROSTATIC HYPERPLASIA WITH WEAK URINARY STREAM: ICD-10-CM

## 2024-07-12 DIAGNOSIS — N30.90 CYSTITIS: Primary | ICD-10-CM

## 2024-07-12 PROCEDURE — 87186 SC STD MICRODIL/AGAR DIL: CPT

## 2024-07-12 PROCEDURE — 87086 URINE CULTURE/COLONY COUNT: CPT

## 2024-07-12 RX ORDER — TADALAFIL 5 MG/1
5 TABLET ORAL DAILY
Qty: 30 TABLET | Refills: 11 | Status: CANCELLED | OUTPATIENT
Start: 2024-07-12 | End: 2025-07-12

## 2024-07-12 RX ORDER — CIPROFLOXACIN 500 MG/1
500 TABLET ORAL 2 TIMES DAILY
Qty: 20 TABLET | Refills: 0 | Status: SHIPPED | OUTPATIENT
Start: 2024-07-12 | End: 2024-07-22

## 2024-07-14 LAB — BACTERIA UR CULT: ABNORMAL

## 2024-07-15 LAB — BACTERIA UR CULT: ABNORMAL

## 2024-07-23 ENCOUNTER — TELEPHONE (OUTPATIENT)
Dept: ENDOCRINOLOGY | Facility: CLINIC | Age: 64
End: 2024-07-23
Payer: MEDICARE

## 2024-07-23 NOTE — TELEPHONE ENCOUNTER
Tiffanie with [facility] sent fax on 7/22/24 requesting update on when pt was last in office. Called at # [phone] to provide update that pt last seen on 4/22/24.

## 2024-07-31 ENCOUNTER — APPOINTMENT (OUTPATIENT)
Dept: UROLOGY | Facility: CLINIC | Age: 64
End: 2024-07-31
Payer: MEDICARE

## 2024-08-26 ENCOUNTER — APPOINTMENT (OUTPATIENT)
Dept: ENDOCRINOLOGY | Facility: CLINIC | Age: 64
End: 2024-08-26
Payer: MEDICARE

## 2024-08-26 ENCOUNTER — LAB (OUTPATIENT)
Dept: LAB | Facility: LAB | Age: 64
End: 2024-08-26
Payer: MEDICARE

## 2024-08-26 VITALS
HEART RATE: 85 BPM | BODY MASS INDEX: 39.03 KG/M2 | SYSTOLIC BLOOD PRESSURE: 120 MMHG | WEIGHT: 272 LBS | DIASTOLIC BLOOD PRESSURE: 77 MMHG

## 2024-08-26 DIAGNOSIS — Z79.4 TYPE 2 DIABETES MELLITUS WITHOUT COMPLICATION, WITH LONG-TERM CURRENT USE OF INSULIN (MULTI): ICD-10-CM

## 2024-08-26 DIAGNOSIS — E11.9 TYPE 2 DIABETES MELLITUS WITHOUT COMPLICATION, WITH LONG-TERM CURRENT USE OF INSULIN (MULTI): ICD-10-CM

## 2024-08-26 LAB
ANION GAP SERPL CALC-SCNC: 16 MMOL/L (ref 10–20)
BUN SERPL-MCNC: 16 MG/DL (ref 6–23)
CALCIUM SERPL-MCNC: 9.1 MG/DL (ref 8.6–10.3)
CHLORIDE SERPL-SCNC: 104 MMOL/L (ref 98–107)
CO2 SERPL-SCNC: 24 MMOL/L (ref 21–32)
CREAT SERPL-MCNC: 1.07 MG/DL (ref 0.5–1.3)
EGFRCR SERPLBLD CKD-EPI 2021: 77 ML/MIN/1.73M*2
GLUCOSE SERPL-MCNC: 142 MG/DL (ref 74–99)
POTASSIUM SERPL-SCNC: 4.3 MMOL/L (ref 3.5–5.3)
SODIUM SERPL-SCNC: 140 MMOL/L (ref 136–145)

## 2024-08-26 PROCEDURE — 3048F LDL-C <100 MG/DL: CPT | Performed by: INTERNAL MEDICINE

## 2024-08-26 PROCEDURE — 3061F NEG MICROALBUMINURIA REV: CPT | Performed by: INTERNAL MEDICINE

## 2024-08-26 PROCEDURE — 84443 ASSAY THYROID STIM HORMONE: CPT

## 2024-08-26 PROCEDURE — 99214 OFFICE O/P EST MOD 30 MIN: CPT | Performed by: INTERNAL MEDICINE

## 2024-08-26 PROCEDURE — 3078F DIAST BP <80 MM HG: CPT | Performed by: INTERNAL MEDICINE

## 2024-08-26 PROCEDURE — 1036F TOBACCO NON-USER: CPT | Performed by: INTERNAL MEDICINE

## 2024-08-26 PROCEDURE — 3074F SYST BP LT 130 MM HG: CPT | Performed by: INTERNAL MEDICINE

## 2024-08-26 PROCEDURE — 36415 COLL VENOUS BLD VENIPUNCTURE: CPT

## 2024-08-26 PROCEDURE — 80048 BASIC METABOLIC PNL TOTAL CA: CPT

## 2024-08-26 PROCEDURE — 83036 HEMOGLOBIN GLYCOSYLATED A1C: CPT

## 2024-08-26 ASSESSMENT — ENCOUNTER SYMPTOMS
ARTHRALGIAS: 1
APNEA: 1
FEVER: 0
CONSTIPATION: 0
VOMITING: 0
UNEXPECTED WEIGHT CHANGE: 0
NUMBNESS: 1
DIARRHEA: 0
NECK PAIN: 1
NERVOUS/ANXIOUS: 0
COUGH: 0
APPETITE CHANGE: 0
SORE THROAT: 0
FREQUENCY: 0
POLYDIPSIA: 0
ROS SKIN COMMENTS: DRY
NAUSEA: 0
ABDOMINAL PAIN: 0
HEADACHES: 0
SHORTNESS OF BREATH: 0

## 2024-08-26 NOTE — PROGRESS NOTES
History Of Present Illness  Tito Christian is a 63 y.o. male     Duration of type 2 diabetes mellitus:  15 years  Complications:  cardiovascular disease  CHF     Lantus 46 units at bedtime  Humalog 20 units before meals.  Add scale (2 units for every 50 over glucose 100 mg/dl)     Metformin 1000 mg BID.   Jardiance 25 mg/day     Patient is testing glucose 3 times daily  Records reviewed, on file     Last eye exam:  4/3/24    Past Medical History  He has a past medical history of Acute postoperative pain (03/01/2019), Bradycardia (08/10/2023), Cataract extraction status (08/10/2023), Chest pain (08/10/2023), CHF (congestive heart failure) (Multi) (08/10/2023), Cortical age-related cataract, bilateral (08/21/2019), Diabetes mellitus (Multi) (03/22/2023), Edema of throat (02/28/2019), Essential hypertension (08/31/2023), Gout (08/10/2023), Heart block (06/30/2023), HTN (hypertension) (02/28/2019), Intermittent complete atrioventricular block (Multi) (08/10/2023), Left knee pain (08/10/2023), Mixed hyperlipidemia (08/31/2023), Mobitz type 1 second degree AV block (07/03/2023), Obesity (08/31/2023), Obesity, Class III, BMI 40-49.9 (morbid obesity) (Multi) (08/10/2023), Other hyperlipidemia (02/28/2019), Pain of left upper extremity (08/31/2023), Preglaucoma, unspecified, 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    Surgical History  He has a past surgical history that includes Colonoscopy (09/23/2014); Other surgical history (10/02/2019); and Other surgical history (08/03/2020).     Social History  He reports that he has never smoked. He has never used smokeless tobacco. He reports that he does not drink alcohol and does not use drugs.    Family History  Family History   Problem Relation  "Name Age of Onset    Other (cardiac disorder) Father      Other (cardiac disorder) Other Uncle        Medications  Current Outpatient Medications   Medication Instructions    aspirin 81 mg, oral, Once    atorvastatin (LIPITOR) 80 mg, oral, Daily    brimonidine (AlphaGAN P) 0.2 % ophthalmic solution ophthalmic (eye), 2 times daily    carvedilol (COREG) 12.5 mg, oral, 2 times daily (morning and late afternoon)    dorzolamide-timoloL (Cosopt) 22.3-6.8 mg/mL ophthalmic solution 1 drop, ophthalmic (eye), 2 times daily    Entresto 24-26 mg tablet 1 tablet, oral, 2 times daily    insulin glargine (LANTUS SOLOSTAR U-100 INSULIN) 46 Units, subcutaneous, Daily    insulin lispro (HumaLOG KwikPen Insulin) 100 unit/mL injection subcutaneous    isosorbide mononitrate ER (IMDUR) 30 mg, oral, Daily    Jardiance 25 mg, oral, Daily    lancets 30 gauge misc     lancing device/lancets (ACCU-CHEK MULTICLIX LANCET MISC) miscellaneous, Daily    latanoprostene bunod (Vyzulta) 0.024 % drops ophthalmic (eye)    metFORMIN (GLUCOPHAGE) 500 mg, oral, 2 times daily (morning and late afternoon)    metOLazone (Zaroxolyn) 2.5 mg tablet oral    metoprolol succinate XL (Toprol-XL) 25 mg 24 hr tablet oral    nitroglycerin (NITROSTAT) 0.4 mg, sublingual, As needed    OneTouch Ultra Test strip USE TO TEST 3 TIMES DAILY    pantoprazole (ProtoNix) 20 mg EC tablet TAKE 1 TABLET BY MOUTH EVERY  OTHER DAY . DO NOT CRUSH, CHEW,  OR SPLIT    pen needle, diabetic 31 gauge x 3/16\" needle miscellaneous, 4 times daily    tamsulosin (FLOMAX) 0.8 mg, oral, Daily    therapeutic multivitamin-iron-minerals (Theragran-M) tablet 1 tablet, oral, Daily with breakfast    torsemide (Demadex) 20 mg tablet oral       Allergies  Patient has no known allergies.    Review of Systems   Constitutional:  Negative for appetite change, fever and unexpected weight change (loss).   HENT:  Positive for dental problem. Negative for sore throat.         Denies dry mouth   Eyes:  Negative " for visual disturbance.   Respiratory:  Positive for apnea (sleep). Negative for cough and shortness of breath.    Cardiovascular:  Negative for chest pain.   Gastrointestinal:  Negative for abdominal pain, constipation, diarrhea, nausea and vomiting.   Endocrine: Negative for polydipsia and polyuria.   Genitourinary:  Negative for frequency.        Prostatism   Musculoskeletal:  Positive for arthralgias and neck pain.   Skin:  Negative for rash.        dry   Neurological:  Positive for numbness. Negative for headaches.   Psychiatric/Behavioral:  The patient is not nervous/anxious.          Last Recorded Vitals  Blood pressure 120/77, pulse 85, weight 123 kg (272 lb).    Physical Exam  Constitutional:       General: He is not in acute distress.     Appearance: He is obese.   HENT:      Head: Normocephalic.      Mouth/Throat:      Mouth: Mucous membranes are moist.   Eyes:      Extraocular Movements: Extraocular movements intact.   Neck:      Thyroid: No thyroid mass or thyromegaly.   Cardiovascular:      Pulses:           Radial pulses are 2+ on the right side and 2+ on the left side.   Musculoskeletal:      Right lower leg: No edema.      Left lower leg: No edema.      Comments: Right 3rd finger distal flexion deformity   Lymphadenopathy:      Cervical: No cervical adenopathy.   Skin:     Comments: Stasis coloration at lower legs   Neurological:      Mental Status: He is alert.      Motor: No tremor.   Psychiatric:         Mood and Affect: Affect normal.          Relevant Results  Glucose   Date Value   04/18/2024 134 mg/dL (H)   08/09/2023 115 mg/dL (H)   06/30/2023 CANCELED   06/29/2023 86 mg/dL     POC HEMOGLOBIN A1c (%)   Date Value   04/22/2024 7.2 (A)   10/16/2023 6.2     Hemoglobin A1C (%)   Date Value   07/03/2023 7.0 (H)   06/27/2023 7.0 (A)   11/09/2021 7.9 (H)   03/24/2021 7.9 (H)     Bicarbonate   Date Value   04/18/2024 28 mmol/L   08/09/2023 27 mmol/L   06/30/2023 CANCELED   06/29/2023 28 mmol/L      Urea Nitrogen   Date Value   04/18/2024 21 mg/dL   08/09/2023 17 mg/dL   06/30/2023 CANCELED   06/29/2023 17 mg/dL     Creatinine   Date Value   04/18/2024 1.17 mg/dL   08/09/2023 1.14 mg/dL   06/30/2023 CANCELED   06/29/2023 1.15 mg/dL     Lab Results   Component Value Date    CHOL 99 04/18/2024    CHOL 118 02/14/2023    CHOL 91 (L) 05/05/2020     Lab Results   Component Value Date    HDL 30.5 04/18/2024    HDL 37.5 (A) 02/14/2023    HDL 31 (L) 05/05/2020     Lab Results   Component Value Date    LDLCALC 29 04/18/2024    LDLCALC 25 (L) 05/05/2020    LDLCALC 82 10/18/2018     Lab Results   Component Value Date    TRIG 196 (H) 04/18/2024    TRIG 121 02/14/2023    TRIG 173 (H) 05/05/2020     Lab Results   Component Value Date    TSH 4.37 (H) 06/29/2023     Lab Results   Component Value Date    ALBUR 12 01/14/2019          IMPRESSION  TYPE 2 DIABETES MELLITUS WITH HYPERGLYCEMIA  LONG TERM CURRENT INSULIN USE  Rapid A1c >14% is likely false, as it does not correlate with decent home glucose records or historical results      RECOMMENDATIONS  Draw A1c, BMP, TSH  Results available on [facility]  Call/message if you have not received results in 3 days.       Follow up 3 months  Bring written glucose record (2 weeks' worth) to all appointments.

## 2024-08-26 NOTE — PATIENT INSTRUCTIONS
RECOMMENDATIONS  Draw A1c, BMP, TSH  Results available on [employer]  Call/message if you have not received results in 3 days.       Follow up 3 months  Bring written glucose record (2 weeks' worth) to all appointments.

## 2024-08-27 LAB
EST. AVERAGE GLUCOSE BLD GHB EST-MCNC: 160 MG/DL
HBA1C MFR BLD: 7.2 %
TSH SERPL-ACNC: 2.18 MIU/L (ref 0.44–3.98)

## 2024-08-31 DIAGNOSIS — E78.00 HYPERCHOLESTEROLEMIA: ICD-10-CM

## 2024-08-31 RX ORDER — ATORVASTATIN CALCIUM 80 MG/1
80 TABLET, FILM COATED ORAL DAILY
Qty: 90 TABLET | Refills: 3 | Status: SHIPPED | OUTPATIENT
Start: 2024-08-31

## 2024-09-13 DIAGNOSIS — E11.9 TYPE 2 DIABETES MELLITUS WITHOUT COMPLICATION, WITH LONG-TERM CURRENT USE OF INSULIN (MULTI): Primary | ICD-10-CM

## 2024-09-13 DIAGNOSIS — Z79.4 TYPE 2 DIABETES MELLITUS WITHOUT COMPLICATION, WITH LONG-TERM CURRENT USE OF INSULIN (MULTI): Primary | ICD-10-CM

## 2024-09-15 RX ORDER — INSULIN LISPRO 100 [IU]/ML
INJECTION, SOLUTION INTRAVENOUS; SUBCUTANEOUS
Qty: 90 ML | Refills: 2 | Status: SHIPPED | OUTPATIENT
Start: 2024-09-15

## 2024-09-17 DIAGNOSIS — E11.51 DIABETES MELLITUS TYPE 2 WITH PERIPHERAL ARTERY DISEASE: ICD-10-CM

## 2024-09-18 RX ORDER — INSULIN GLARGINE 100 [IU]/ML
INJECTION, SOLUTION SUBCUTANEOUS
Qty: 60 ML | Refills: 2 | Status: SHIPPED | OUTPATIENT
Start: 2024-09-18

## 2024-10-07 ENCOUNTER — APPOINTMENT (OUTPATIENT)
Dept: OPHTHALMOLOGY | Facility: CLINIC | Age: 64
End: 2024-10-07
Payer: MEDICARE

## 2024-10-07 DIAGNOSIS — H40.1131 PRIMARY OPEN ANGLE GLAUCOMA (POAG) OF BOTH EYES, MILD STAGE: Primary | ICD-10-CM

## 2024-10-07 PROCEDURE — 99214 OFFICE O/P EST MOD 30 MIN: CPT | Performed by: OPHTHALMOLOGY

## 2024-10-07 PROCEDURE — 92083 EXTENDED VISUAL FIELD XM: CPT | Performed by: OPHTHALMOLOGY

## 2024-10-07 RX ORDER — LATANOPROSTENE BUNOD 0.24 MG/ML
1 SOLUTION/ DROPS OPHTHALMIC NIGHTLY
Qty: 7.5 ML | Refills: 3 | Status: SHIPPED | OUTPATIENT
Start: 2024-10-07 | End: 2025-10-07

## 2024-10-07 RX ORDER — DORZOLAMIDE HYDROCHLORIDE AND TIMOLOL MALEATE 20; 5 MG/ML; MG/ML
1 SOLUTION/ DROPS OPHTHALMIC 2 TIMES DAILY
Qty: 30 ML | Refills: 3 | Status: SHIPPED | OUTPATIENT
Start: 2024-10-07 | End: 2025-10-07

## 2024-10-07 RX ORDER — BRIMONIDINE TARTRATE 2 MG/ML
1 SOLUTION/ DROPS OPHTHALMIC 2 TIMES DAILY
Qty: 30 ML | Refills: 3 | Status: SHIPPED | OUTPATIENT
Start: 2024-10-07 | End: 2025-10-07

## 2024-10-07 RX ORDER — NAPROXEN SODIUM 220 MG/1
TABLET, FILM COATED ORAL
COMMUNITY

## 2024-10-07 RX ORDER — SPIRONOLACTONE 25 MG/1
TABLET ORAL
COMMUNITY
Start: 2024-08-26

## 2024-10-07 RX ORDER — CEPHALEXIN 500 MG/1
1 CAPSULE ORAL
COMMUNITY
Start: 2023-12-19

## 2024-10-07 ASSESSMENT — REFRACTION_WEARINGRX
OD_SPHERE: -1.75
OD_CYLINDER: +1.00
OS_AXIS: 165
OS_SPHERE: -3.25
OS_ADD: +2.50
OD_ADD: +2.50
OS_CYLINDER: +0.50
OD_AXIS: 085

## 2024-10-07 ASSESSMENT — EXTERNAL EXAM - LEFT EYE: OS_EXAM: NORMAL

## 2024-10-07 ASSESSMENT — PACHYMETRY
OS_CT(UM): 565
OD_CT(UM): 557

## 2024-10-07 ASSESSMENT — TONOMETRY
IOP_METHOD: GOLDMANN APPLANATION
OS_IOP_MMHG: 19
OD_IOP_MMHG: 18

## 2024-10-07 ASSESSMENT — VISUAL ACUITY
METHOD: SNELLEN - LINEAR
OS_CC: 20/25
CORRECTION_TYPE: GLASSES
OD_CC: 20/20
OD_CC+: -1
OS_CC+: -2

## 2024-10-07 ASSESSMENT — SLIT LAMP EXAM - LIDS
COMMENTS: GOOD POSITION
COMMENTS: GOOD POSITION

## 2024-10-07 ASSESSMENT — EXTERNAL EXAM - RIGHT EYE: OD_EXAM: NORMAL

## 2024-10-07 ASSESSMENT — ENCOUNTER SYMPTOMS
GASTROINTESTINAL NEGATIVE: 0
PSYCHIATRIC NEGATIVE: 0
CARDIOVASCULAR NEGATIVE: 0
CONSTITUTIONAL NEGATIVE: 0
EYES NEGATIVE: 1
NEUROLOGICAL NEGATIVE: 0
ENDOCRINE NEGATIVE: 0
MUSCULOSKELETAL NEGATIVE: 0
ALLERGIC/IMMUNOLOGIC NEGATIVE: 0
HEMATOLOGIC/LYMPHATIC NEGATIVE: 0
RESPIRATORY NEGATIVE: 0

## 2024-10-07 NOTE — PROGRESS NOTES
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   [facility] Visual Field - OU - Both Eyes          NO EVIDENCE OF GLAUCOMA          OCT, Optic Nerve - OU - Both Eyes          Right Eye  Images reviewed and comparison made to baseline. To assess optic nerve function and for use in future follow-up. Reliability: good and adequate.     Left Eye  Images reviewed and comparison made to baseline. To assess optic nerve function and for use in future follow-up. Reliability: good and adequate.     Notes  Robust and stable ou           had slt done on 7/2, no response   goal IOP under 21 OU, remains stable    cont vyzulta qhs OU, alphagan BID OU, cosopt BID OU   RTC 6 months with dfe/oct rnfl      pseudophakiaOU   with -2/50 target   monitor

## 2024-10-10 DIAGNOSIS — E11.51 DIABETES MELLITUS TYPE 2 WITH PERIPHERAL ARTERY DISEASE: ICD-10-CM

## 2024-10-10 RX ORDER — EMPAGLIFLOZIN 25 MG/1
25 TABLET, FILM COATED ORAL DAILY
Qty: 100 TABLET | Refills: 2 | Status: SHIPPED | OUTPATIENT
Start: 2024-10-10

## 2024-10-14 ENCOUNTER — APPOINTMENT (OUTPATIENT)
Dept: PRIMARY CARE | Facility: CLINIC | Age: 64
End: 2024-10-14
Payer: MEDICARE

## 2024-10-28 ENCOUNTER — LAB (OUTPATIENT)
Dept: LAB | Facility: LAB | Age: 64
End: 2024-10-28
Payer: MEDICARE

## 2024-10-28 DIAGNOSIS — R97.20 ELEVATED PSA: ICD-10-CM

## 2024-10-28 PROCEDURE — 84153 ASSAY OF PSA TOTAL: CPT

## 2024-10-28 PROCEDURE — 36415 COLL VENOUS BLD VENIPUNCTURE: CPT

## 2024-10-29 LAB — PSA SERPL-MCNC: 3.32 NG/ML

## 2024-11-07 ENCOUNTER — APPOINTMENT (OUTPATIENT)
Dept: UROLOGY | Facility: CLINIC | Age: 64
End: 2024-11-07
Payer: MEDICARE

## 2024-11-11 ENCOUNTER — APPOINTMENT (OUTPATIENT)
Dept: UROLOGY | Facility: CLINIC | Age: 64
End: 2024-11-11
Payer: MEDICARE

## 2024-11-11 DIAGNOSIS — Z12.5 ENCOUNTER FOR SCREENING PROSTATE SPECIFIC ANTIGEN (PSA) MEASUREMENT: ICD-10-CM

## 2024-11-11 DIAGNOSIS — N40.1 BENIGN PROSTATIC HYPERPLASIA WITH LOWER URINARY TRACT SYMPTOMS, SYMPTOM DETAILS UNSPECIFIED: Primary | ICD-10-CM

## 2024-11-11 PROCEDURE — 99213 OFFICE O/P EST LOW 20 MIN: CPT | Performed by: STUDENT IN AN ORGANIZED HEALTH CARE EDUCATION/TRAINING PROGRAM

## 2024-11-11 PROCEDURE — 3061F NEG MICROALBUMINURIA REV: CPT | Performed by: STUDENT IN AN ORGANIZED HEALTH CARE EDUCATION/TRAINING PROGRAM

## 2024-11-11 PROCEDURE — 3048F LDL-C <100 MG/DL: CPT | Performed by: STUDENT IN AN ORGANIZED HEALTH CARE EDUCATION/TRAINING PROGRAM

## 2024-11-11 PROCEDURE — G2211 COMPLEX E/M VISIT ADD ON: HCPCS | Performed by: STUDENT IN AN ORGANIZED HEALTH CARE EDUCATION/TRAINING PROGRAM

## 2024-11-11 PROCEDURE — 3051F HG A1C>EQUAL 7.0%<8.0%: CPT | Performed by: STUDENT IN AN ORGANIZED HEALTH CARE EDUCATION/TRAINING PROGRAM

## 2024-12-01 DIAGNOSIS — E11.51 DIABETES MELLITUS TYPE 2 WITH PERIPHERAL ARTERY DISEASE: ICD-10-CM

## 2024-12-02 ENCOUNTER — APPOINTMENT (OUTPATIENT)
Dept: ENDOCRINOLOGY | Facility: CLINIC | Age: 64
End: 2024-12-02
Payer: MEDICARE

## 2024-12-02 DIAGNOSIS — E11.9 TYPE 2 DIABETES MELLITUS WITHOUT COMPLICATION, WITH LONG-TERM CURRENT USE OF INSULIN (MULTI): Primary | ICD-10-CM

## 2024-12-02 DIAGNOSIS — Z79.4 TYPE 2 DIABETES MELLITUS WITHOUT COMPLICATION, WITH LONG-TERM CURRENT USE OF INSULIN (MULTI): Primary | ICD-10-CM

## 2024-12-02 RX ORDER — PEN NEEDLE, DIABETIC 30 GX3/16"
NEEDLE, DISPOSABLE MISCELLANEOUS
Qty: 400 EACH | Refills: 3 | Status: SHIPPED | OUTPATIENT
Start: 2024-12-02

## 2024-12-02 RX ORDER — BLOOD SUGAR DIAGNOSTIC
STRIP MISCELLANEOUS
Qty: 300 STRIP | Refills: 2 | Status: SHIPPED | OUTPATIENT
Start: 2024-12-02

## 2024-12-10 ENCOUNTER — APPOINTMENT (OUTPATIENT)
Dept: PRIMARY CARE | Facility: CLINIC | Age: 64
End: 2024-12-10
Payer: MEDICARE

## 2024-12-10 VITALS
SYSTOLIC BLOOD PRESSURE: 108 MMHG | BODY MASS INDEX: 40.37 KG/M2 | HEIGHT: 70 IN | DIASTOLIC BLOOD PRESSURE: 73 MMHG | OXYGEN SATURATION: 98 % | HEART RATE: 87 BPM | WEIGHT: 282 LBS

## 2024-12-10 DIAGNOSIS — Z00.00 ROUTINE GENERAL MEDICAL EXAMINATION AT HEALTH CARE FACILITY: Primary | ICD-10-CM

## 2024-12-10 DIAGNOSIS — I10 BENIGN ESSENTIAL HYPERTENSION: ICD-10-CM

## 2024-12-10 DIAGNOSIS — E11.51 DIABETES MELLITUS TYPE 2 WITH PERIPHERAL ARTERY DISEASE: ICD-10-CM

## 2024-12-10 PROCEDURE — G0439 PPPS, SUBSEQ VISIT: HCPCS

## 2024-12-10 PROCEDURE — 3048F LDL-C <100 MG/DL: CPT

## 2024-12-10 PROCEDURE — 3051F HG A1C>EQUAL 7.0%<8.0%: CPT

## 2024-12-10 PROCEDURE — 3078F DIAST BP <80 MM HG: CPT

## 2024-12-10 PROCEDURE — 3008F BODY MASS INDEX DOCD: CPT

## 2024-12-10 PROCEDURE — 3061F NEG MICROALBUMINURIA REV: CPT

## 2024-12-10 PROCEDURE — 3074F SYST BP LT 130 MM HG: CPT

## 2024-12-10 PROCEDURE — 99396 PREV VISIT EST AGE 40-64: CPT

## 2024-12-10 PROCEDURE — 1036F TOBACCO NON-USER: CPT

## 2024-12-10 RX ORDER — METFORMIN HYDROCHLORIDE 500 MG/1
500 TABLET ORAL
Qty: 200 TABLET | Refills: 2 | Status: SHIPPED | OUTPATIENT
Start: 2024-12-10

## 2024-12-10 RX ORDER — CARVEDILOL 12.5 MG/1
12.5 TABLET ORAL
Qty: 180 TABLET | Refills: 3 | Status: SHIPPED | OUTPATIENT
Start: 2024-12-10

## 2024-12-10 RX ORDER — ISOSORBIDE MONONITRATE 60 MG/1
TABLET, EXTENDED RELEASE ORAL
COMMUNITY
Start: 2024-11-07

## 2024-12-10 ASSESSMENT — ENCOUNTER SYMPTOMS
HYPERACTIVE: 0
SINUS PAIN: 0
NUMBNESS: 0
BLOOD IN STOOL: 0
CONSTIPATION: 0
EYE DISCHARGE: 0
FREQUENCY: 0
FATIGUE: 0
SLEEP DISTURBANCE: 0
RHINORRHEA: 0
HEADACHES: 0
VOICE CHANGE: 0
FEVER: 0
BACK PAIN: 0
SORE THROAT: 0
SHORTNESS OF BREATH: 0
HEMATURIA: 0
WHEEZING: 0
DIFFICULTY URINATING: 0
SINUS PRESSURE: 0
WEAKNESS: 0
EYE ITCHING: 0
NERVOUS/ANXIOUS: 0
COUGH: 1
DIARRHEA: 0
SPEECH DIFFICULTY: 0
PALPITATIONS: 0
NECK PAIN: 0
CHEST TIGHTNESS: 0
VOMITING: 0
AGITATION: 0
WOUND: 0
MYALGIAS: 0
DIZZINESS: 0
JOINT SWELLING: 0
EYE PAIN: 0
DYSPHORIC MOOD: 0
ABDOMINAL PAIN: 0

## 2024-12-10 ASSESSMENT — PATIENT HEALTH QUESTIONNAIRE - PHQ9
SUM OF ALL RESPONSES TO PHQ9 QUESTIONS 1 AND 2: 0
2. FEELING DOWN, DEPRESSED OR HOPELESS: NOT AT ALL
1. LITTLE INTEREST OR PLEASURE IN DOING THINGS: NOT AT ALL

## 2024-12-10 ASSESSMENT — ACTIVITIES OF DAILY LIVING (ADL)
TAKING_MEDICATION: INDEPENDENT
DOING_HOUSEWORK: INDEPENDENT
MANAGING_FINANCES: INDEPENDENT
DRESSING: INDEPENDENT
GROCERY_SHOPPING: INDEPENDENT
BATHING: INDEPENDENT

## 2024-12-10 NOTE — ASSESSMENT & PLAN NOTE
Orders:    carvedilol (Coreg) 12.5 mg tablet; Take 1 tablet (12.5 mg) by mouth 2 times daily (morning and late afternoon).

## 2024-12-10 NOTE — PROGRESS NOTES
Subjective   Reason for Visit: Tito Christian is an 63 y.o. male here for a Medicare Wellness visit.   MAW (CAN DO CPE) - transfer of care from         Reviewed all medications by prescribing practitioner or clinical pharmacist (such as prescriptions, OTCs, herbal therapies and supplements) and documented in the medical record.    Pt is here for annual wellness.  Reports overall health is fair    Do you take any herbs or supplements that were not prescribed by a doctor? no  Are you taking calcium supplements? no  Are you taking aspirin daily? yes  Colonoscopy: uptodate  Fasting blood work: uptodate  Last eye exam: sep 2024  Last dental Exam: years  Exercise:some walking  Mood:pleasant  Sleep: pretty good  Diet:  working on it  Occupation:  retired from [employer]  Do you have pain that bothers you in your daily life? no    1. Patient Counseling:  --Nutrition: Stressed importance of moderation in sodium/caffeine intake, saturated fat and cholesterol, caloric balance, sufficient intake of fresh fruits, vegetables, fiber, calcium, iron, and 1 mg of folate supplement per day (for females capable of pregnancy).  --Discussed the issue of estrogen replacement, calcium supplement, and the daily use of baby aspirin as appropriate per pt.  --Exercise: Stressed the importance of regular exercise.   --Substance Abuse: Discussed cessation/primary prevention of tobacco, alcohol, or other drug use; driving or other dangerous activities under the influence; availability of treatment for abuse.    --Sexuality: Discussed sexually transmitted diseases, partner selection, use of condoms, avoidance of unintended pregnancy  and contraceptive alternatives.   --Injury prevention: Discussed safety belts, safety helmets, smoke detector, smoking near bedding or upholstery.   --Dental health: Discussed importance of regular tooth brushing, flossing, and dental visits.  --Immunizations reviewed.  --Discussed benefits of colon cancer  "screening.  --After hours service discussed with patient  2 Discussed the patient's BMI.  The BMI is above average. The patient received Provided instructions on dietary changes  Provided instructions on exercise because they have an above normal BMI.  3 Follow up in 6 months            Patient Care Team:  HUBER Hancock-CNP as PCP - General (Family Medicine)  MARVIN Don as PCP - [employer]age PCP     Review of Systems   Constitutional:  Negative for fatigue and fever.   HENT:  Negative for congestion, ear discharge, ear pain, nosebleeds, postnasal drip, rhinorrhea, sinus pressure, sinus pain, sore throat, tinnitus and voice change.    Eyes:  Negative for pain, discharge and itching.   Respiratory:  Positive for cough. Negative for chest tightness, shortness of breath and wheezing.    Cardiovascular:  Negative for chest pain, palpitations and leg swelling.        Follows with cardiology    Gastrointestinal:  Negative for abdominal pain, blood in stool, constipation, diarrhea and vomiting.   Endocrine: Negative for cold intolerance, heat intolerance and polyuria.        Follows with endo for dm     Genitourinary:  Negative for difficulty urinating, frequency, hematuria, penile pain, penile swelling, scrotal swelling, testicular pain and urgency.        Follows with urology for prostate   Musculoskeletal:  Negative for back pain, gait problem, joint swelling, myalgias and neck pain.   Skin:  Negative for rash and wound.   Neurological:  Negative for dizziness, speech difficulty, weakness, numbness and headaches.   Psychiatric/Behavioral:  Negative for agitation, dysphoric mood and sleep disturbance. The patient is not nervous/anxious and is not hyperactive.        Objective   Vitals:  /73   Pulse 87   Ht 1.778 m (5' 10\")   Wt 128 kg (282 lb)   SpO2 98%   BMI 40.46 kg/m²       Physical Exam  Constitutional:       Appearance: Normal appearance.   HENT:      Head: " Normocephalic and atraumatic.      Right Ear: Tympanic membrane and external ear normal.      Left Ear: Tympanic membrane and external ear normal.      Nose: Nose normal.      Mouth/Throat:      Mouth: Mucous membranes are moist.      Pharynx: Oropharynx is clear. Uvula midline.   Eyes:      General: Lids are normal.      Extraocular Movements: Extraocular movements intact.      Pupils: Pupils are equal, round, and reactive to light.   Neck:      Thyroid: No thyromegaly or thyroid tenderness.   Cardiovascular:      Rate and Rhythm: Normal rate and regular rhythm.      Heart sounds: Normal heart sounds.   Pulmonary:      Effort: Pulmonary effort is normal.      Breath sounds: Normal breath sounds.   Abdominal:      General: Bowel sounds are normal.      Palpations: Abdomen is soft.      Tenderness: There is no abdominal tenderness. There is no guarding.   Musculoskeletal:         General: No swelling. Normal range of motion.      Cervical back: Normal range of motion.      Right lower leg: No edema.      Left lower leg: No edema.   Lymphadenopathy:      Head:      Right side of head: No submental, submandibular or tonsillar adenopathy.      Left side of head: No submental, submandibular or tonsillar adenopathy.      Cervical: No cervical adenopathy.   Skin:     General: Skin is warm and dry.      Capillary Refill: Capillary refill takes less than 2 seconds.      Coloration: Skin is not jaundiced.      Findings: No lesion or rash.   Neurological:      General: No focal deficit present.      Mental Status: He is alert and oriented to person, place, and time.   Psychiatric:         Mood and Affect: Mood normal.         Behavior: Behavior normal.         Thought Content: Thought content normal.         Judgment: Judgment normal.         Assessment & Plan  Routine general medical examination at health care facility    Orders:    1 Year Follow Up In Primary Care - Wellness Exam; Future    Benign essential  hypertension    Orders:    carvedilol (Coreg) 12.5 mg tablet; Take 1 tablet (12.5 mg) by mouth 2 times daily (morning and late afternoon).

## 2024-12-12 DIAGNOSIS — N13.8 BPH WITH OBSTRUCTION/LOWER URINARY TRACT SYMPTOMS: ICD-10-CM

## 2024-12-12 DIAGNOSIS — N40.1 BPH WITH OBSTRUCTION/LOWER URINARY TRACT SYMPTOMS: ICD-10-CM

## 2024-12-12 RX ORDER — TAMSULOSIN HYDROCHLORIDE 0.4 MG/1
0.8 CAPSULE ORAL DAILY
Qty: 180 CAPSULE | Refills: 3 | Status: SHIPPED | OUTPATIENT
Start: 2024-12-12 | End: 2025-12-12

## 2025-01-08 DIAGNOSIS — E11.51 DIABETES MELLITUS TYPE 2 WITH PERIPHERAL ARTERY DISEASE: ICD-10-CM

## 2025-01-08 DIAGNOSIS — E78.00 HYPERCHOLESTEROLEMIA: ICD-10-CM

## 2025-01-10 RX ORDER — ATORVASTATIN CALCIUM 80 MG/1
80 TABLET, FILM COATED ORAL DAILY
Qty: 90 TABLET | Refills: 3 | Status: SHIPPED | OUTPATIENT
Start: 2025-01-10

## 2025-01-10 RX ORDER — METFORMIN HYDROCHLORIDE 500 MG/1
500 TABLET ORAL
Qty: 180 TABLET | Refills: 3 | Status: SHIPPED | OUTPATIENT
Start: 2025-01-10

## 2025-02-26 DIAGNOSIS — H40.1131 PRIMARY OPEN ANGLE GLAUCOMA (POAG) OF BOTH EYES, MILD STAGE: ICD-10-CM

## 2025-02-26 RX ORDER — LATANOPROSTENE BUNOD 0.24 MG/ML
SOLUTION/ DROPS OPHTHALMIC
Qty: 15 ML | Refills: 2 | Status: SHIPPED | OUTPATIENT
Start: 2025-02-26

## 2025-03-10 ENCOUNTER — APPOINTMENT (OUTPATIENT)
Dept: ENDOCRINOLOGY | Facility: CLINIC | Age: 65
End: 2025-03-10
Payer: MEDICARE

## 2025-03-10 VITALS
BODY MASS INDEX: 39.03 KG/M2 | WEIGHT: 272 LBS | DIASTOLIC BLOOD PRESSURE: 66 MMHG | HEART RATE: 84 BPM | SYSTOLIC BLOOD PRESSURE: 100 MMHG

## 2025-03-10 DIAGNOSIS — Z79.4 TYPE 2 DIABETES MELLITUS WITHOUT COMPLICATION, WITH LONG-TERM CURRENT USE OF INSULIN: ICD-10-CM

## 2025-03-10 DIAGNOSIS — E11.9 TYPE 2 DIABETES MELLITUS WITHOUT COMPLICATION, WITH LONG-TERM CURRENT USE OF INSULIN: ICD-10-CM

## 2025-03-10 LAB — POC HEMOGLOBIN A1C: 6.9 % (ref 4.2–6.5)

## 2025-03-10 PROCEDURE — G2211 COMPLEX E/M VISIT ADD ON: HCPCS | Performed by: INTERNAL MEDICINE

## 2025-03-10 PROCEDURE — 99214 OFFICE O/P EST MOD 30 MIN: CPT | Performed by: INTERNAL MEDICINE

## 2025-03-10 PROCEDURE — 1159F MED LIST DOCD IN RCRD: CPT | Performed by: INTERNAL MEDICINE

## 2025-03-10 PROCEDURE — 1157F ADVNC CARE PLAN IN RCRD: CPT | Performed by: INTERNAL MEDICINE

## 2025-03-10 PROCEDURE — 3078F DIAST BP <80 MM HG: CPT | Performed by: INTERNAL MEDICINE

## 2025-03-10 PROCEDURE — 1160F RVW MEDS BY RX/DR IN RCRD: CPT | Performed by: INTERNAL MEDICINE

## 2025-03-10 PROCEDURE — 3074F SYST BP LT 130 MM HG: CPT | Performed by: INTERNAL MEDICINE

## 2025-03-10 PROCEDURE — 83036 HEMOGLOBIN GLYCOSYLATED A1C: CPT | Performed by: INTERNAL MEDICINE

## 2025-03-10 RX ORDER — SODIUM CHLORIDE 0.9 % (FLUSH) 0.9 %
10 SYRINGE (ML) INJECTION
COMMUNITY
Start: 2025-03-05 | End: 2025-03-13

## 2025-03-10 ASSESSMENT — ENCOUNTER SYMPTOMS
DIARRHEA: 0
SHORTNESS OF BREATH: 0
HEADACHES: 0
NUMBNESS: 1
POLYDIPSIA: 0
NECK PAIN: 1
APPETITE CHANGE: 0
APNEA: 1
CONSTIPATION: 0
VOMITING: 0
NAUSEA: 0
ARTHRALGIAS: 0
SORE THROAT: 0
ABDOMINAL PAIN: 0
FREQUENCY: 0
FEVER: 0
UNEXPECTED WEIGHT CHANGE: 0
COUGH: 0
NERVOUS/ANXIOUS: 0

## 2025-03-10 NOTE — PATIENT INSTRUCTIONS
A1c 6.9%      RECOMMENDATIONS  Continue current program    Follow up 6 months  Labs as ordered by E. Wakefield.

## 2025-03-10 NOTE — PROGRESS NOTES
History Of Present Illness  Tito Christian is a 64 y.o. male     Duration of type 2 diabetes mellitus:  15 years  Complications:  cardiovascular disease  CHF     Lantus 46 units at bedtime  Humalog 20 units before meals.  Add scale (2 units for every 50 over glucose 100 mg/dl)     Metformin 1000 mg BID.   Jardiance 25 mg/day     Patient is testing glucose 3 times daily  Records reviewed, on file     Last eye exam:  4/3/24       Past Medical History  He has a past medical history of Acute postoperative pain (03/01/2019), Bradycardia (08/10/2023), Cataract extraction status (08/10/2023), Chest pain (08/10/2023), CHF (congestive heart failure) (08/10/2023), Cortical age-related cataract, bilateral (08/21/2019), Diabetes mellitus (Multi) (03/22/2023), Edema of throat (02/28/2019), Essential hypertension (08/31/2023), Gout (08/10/2023), Heart block (06/30/2023), HTN (hypertension) (02/28/2019), Intermittent complete atrioventricular block (Multi) (08/10/2023), Left knee pain (08/10/2023), Mixed hyperlipidemia (08/31/2023), Mobitz type 1 second degree AV block (07/03/2023), Obesity (08/31/2023), Obesity, Class III, BMI 40-49.9 (morbid obesity) (Multi) (08/10/2023), Other hyperlipidemia (02/28/2019), Pain of left upper extremity (08/31/2023), Preglaucoma, unspecified, 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    Surgical History  He has a past surgical history that includes Colonoscopy (09/23/2014); Other surgical history (10/02/2019); and Other surgical history (08/03/2020).     Social History  He reports that he has never smoked. He has never used smokeless tobacco. He reports that he does not drink alcohol and does not use drugs.    Family History  Family History   Problem Relation Name  "Age of Onset    Other (cardiac disorder) Father      Other (cardiac disorder) Other Uncle        Medications  Current Outpatient Medications   Medication Instructions    aspirin 81 mg chewable tablet Daily RT    aspirin 81 mg, Once    atorvastatin (LIPITOR) 80 mg, oral, Daily    brimonidine (AlphaGAN) 0.2 % ophthalmic solution 1 drop, Both Eyes, 2 times daily    carvedilol (COREG) 12.5 mg, oral, 2 times daily (morning and late afternoon)    cephalexin (Keflex) 500 mg capsule 1 capsule, Every 12 hours scheduled (0630,1830)    dorzolamide-timoloL (Cosopt) 22.3-6.8 mg/mL ophthalmic solution 1 drop, Both Eyes, 2 times daily    Entresto 24-26 mg tablet 1 tablet, 2 times daily    HumaLOG KwikPen Insulin 100 unit/mL injection INJECT SUBCUTANEOUSLY 20 TO 30  UNITS 3 TIMES DAILY    isosorbide mononitrate ER (Imdur) 60 mg 24 hr tablet     isosorbide mononitrate ER (IMDUR) 30 mg, Daily    Jardiance 25 mg, oral, Daily    lancets 30 gauge misc     lancing device/lancets (ACCU-CHEK MULTICLIX LANCET MISC) Daily    Lantus Solostar U-100 Insulin 100 unit/mL (3 mL) pen INJECT SUBCUTANEOUSLY 46 UNITS  ONCE DAILY    metFORMIN (GLUCOPHAGE) 500 mg, oral, 2 times daily (morning and late afternoon)    metOLazone (Zaroxolyn) 2.5 mg tablet Take by mouth.    metoprolol succinate XL (Toprol-XL) 25 mg 24 hr tablet Take by mouth.    nitroglycerin (NITROSTAT) 0.4 mg, As needed    OneTouch Ultra Test strip USE TO TEST 3 TIMES DAILY    pen needle, diabetic 31 gauge x 3/16\" needle Four times daily    sodium chloride 0.9% (sodium chloride) flush 10 mL    spironolactone (Aldactone) 25 mg tablet     tamsulosin (FLOMAX) 0.8 mg, oral, Daily    therapeutic multivitamin-iron-minerals (Theragran-M) tablet 1 tablet, Daily with breakfast    torsemide (Demadex) 20 mg tablet Take by mouth.    Vyzulta 0.024 % drops INSTILL 1 DROP INTO THE AFFECTED EYE(S) ONCE DAILY AT BEDTIME       Allergies  Patient has no known allergies.    Review of Systems   Constitutional: "  Negative for appetite change, fever and unexpected weight change (loss 5 lb).   HENT:  Positive for dental problem. Negative for sore throat.         Denies dry mouth   Eyes:  Negative for visual disturbance.   Respiratory:  Positive for apnea (sleep). Negative for cough and shortness of breath.    Cardiovascular:  Negative for chest pain.   Gastrointestinal:  Negative for abdominal pain, constipation, diarrhea, nausea and vomiting.   Endocrine: Negative for polydipsia and polyuria.   Genitourinary:  Negative for frequency.        Prostatism   Musculoskeletal:  Positive for neck pain. Negative for arthralgias.   Skin:  Negative for rash.   Neurological:  Positive for numbness. Negative for headaches.   Psychiatric/Behavioral:  The patient is not nervous/anxious.          Last Recorded Vitals  Blood pressure 100/66, pulse 84, weight 123 kg (272 lb).    Physical Exam  Constitutional:       General: He is not in acute distress.  HENT:      Head: Normocephalic.   Neurological:      Mental Status: He is alert.   Psychiatric:         Mood and Affect: Affect normal.          Relevant Results  Glucose   Date Value   08/26/2024 142 mg/dL (H)   04/18/2024 134 mg/dL (H)   08/09/2023 115 mg/dL (H)   06/30/2023 CANCELED   06/29/2023 86 mg/dL     POC HEMOGLOBIN A1c (%)   Date Value   04/22/2024 7.2 (A)   10/16/2023 6.2     Hemoglobin A1C (%)   Date Value   08/26/2024 7.2 (H)   07/03/2023 7.0 (H)   06/27/2023 7.0 (A)   11/09/2021 7.9 (H)   03/24/2021 7.9 (H)     Bicarbonate   Date Value   08/26/2024 24 mmol/L   04/18/2024 28 mmol/L   08/09/2023 27 mmol/L   06/30/2023 CANCELED   06/29/2023 28 mmol/L     Urea Nitrogen   Date Value   08/26/2024 16 mg/dL   04/18/2024 21 mg/dL   08/09/2023 17 mg/dL   06/30/2023 CANCELED   06/29/2023 17 mg/dL     Creatinine   Date Value   08/26/2024 1.07 mg/dL   04/18/2024 1.17 mg/dL   08/09/2023 1.14 mg/dL   06/30/2023 CANCELED   06/29/2023 1.15 mg/dL     Lab Results   Component Value Date    CHOL 99  04/18/2024    CHOL 118 02/14/2023    CHOL 91 (L) 05/05/2020     Lab Results   Component Value Date    HDL 30.5 04/18/2024    HDL 37.5 (A) 02/14/2023    HDL 31 (L) 05/05/2020     Lab Results   Component Value Date    LDLCALC 29 04/18/2024    LDLCALC 25 (L) 05/05/2020    LDLCALC 82 10/18/2018     Lab Results   Component Value Date    TRIG 196 (H) 04/18/2024    TRIG 121 02/14/2023    TRIG 173 (H) 05/05/2020     Lab Results   Component Value Date    TSH 2.18 08/26/2024     Lab Results   Component Value Date    ALBUR 12 01/14/2019          IMPRESSION  TYPE 2 DIABETES MELLITUS   LONG TERM CURRENT INSULIN USE   Rapid A1c 6.9%      RECOMMENDATIONS  Continue current program    Follow up 6 months  Labs as ordered by JUVENAL Wakefield.

## 2025-05-04 DIAGNOSIS — Z79.4 TYPE 2 DIABETES MELLITUS WITHOUT COMPLICATION, WITH LONG-TERM CURRENT USE OF INSULIN: ICD-10-CM

## 2025-05-04 DIAGNOSIS — E11.9 TYPE 2 DIABETES MELLITUS WITHOUT COMPLICATION, WITH LONG-TERM CURRENT USE OF INSULIN: ICD-10-CM

## 2025-05-04 RX ORDER — INSULIN LISPRO 100 [IU]/ML
INJECTION, SOLUTION INTRAVENOUS; SUBCUTANEOUS
Qty: 90 ML | Refills: 2 | Status: SHIPPED | OUTPATIENT
Start: 2025-05-04

## 2025-05-05 ENCOUNTER — APPOINTMENT (OUTPATIENT)
Dept: OPHTHALMOLOGY | Facility: CLINIC | Age: 65
End: 2025-05-05
Payer: MEDICARE

## 2025-05-05 DIAGNOSIS — H40.053 BILATERAL OCULAR HYPERTENSION: Primary | ICD-10-CM

## 2025-05-05 DIAGNOSIS — H40.1131 PRIMARY OPEN ANGLE GLAUCOMA (POAG) OF BOTH EYES, MILD STAGE: ICD-10-CM

## 2025-05-05 PROCEDURE — G2211 COMPLEX E/M VISIT ADD ON: HCPCS | Performed by: OPHTHALMOLOGY

## 2025-05-05 PROCEDURE — 99214 OFFICE O/P EST MOD 30 MIN: CPT | Performed by: OPHTHALMOLOGY

## 2025-05-05 PROCEDURE — 92133 CPTRZD OPH DX IMG PST SGM ON: CPT | Performed by: OPHTHALMOLOGY

## 2025-05-05 ASSESSMENT — EXTERNAL EXAM - LEFT EYE: OS_EXAM: NORMAL

## 2025-05-05 ASSESSMENT — TONOMETRY
OS_IOP_MMHG: 18
OD_IOP_MMHG: 18
IOP_METHOD: GOLDMANN APPLANATION

## 2025-05-05 ASSESSMENT — CUP TO DISC RATIO
OD_RATIO: 0.3
OS_RATIO: 0.3

## 2025-05-05 ASSESSMENT — PAIN SCALES - GENERAL: PAINLEVEL_OUTOF10: 0-NO PAIN

## 2025-05-05 ASSESSMENT — EXTERNAL EXAM - RIGHT EYE: OD_EXAM: NORMAL

## 2025-05-05 ASSESSMENT — PACHYMETRY
OD_CT(UM): 557
OS_CT(UM): 565

## 2025-05-05 ASSESSMENT — SLIT LAMP EXAM - LIDS
COMMENTS: GOOD POSITION
COMMENTS: GOOD POSITION

## 2025-05-05 NOTE — PROGRESS NOTES
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   Hutchins Visual Field - OU - Both Eyes          NO EVIDENCE OF GLAUCOMA          OCT, Optic Nerve - OU - Both Eyes      OCT, Optic Nerve - OU - Both Eyes          Artifactual scans OU  Relatively stable to baseline in 2018, although fluctuates           had slt done on 7/2, no response   goal IOP under 21 OU, remains stable    cont vyzulta qhs OU, alphagan BID OU, cosopt BID OU   RTC 6 months with HVF 24-2 and IOP check      pseudophakiaOU   with -2/50 target   monitor      Diabetes with moderate retinopathy OU  Recommend establish with retina for regular exams  Continue bs control

## 2025-06-10 ENCOUNTER — APPOINTMENT (OUTPATIENT)
Dept: PRIMARY CARE | Facility: CLINIC | Age: 65
End: 2025-06-10
Payer: MEDICARE

## 2025-06-10 VITALS
SYSTOLIC BLOOD PRESSURE: 100 MMHG | BODY MASS INDEX: 39.8 KG/M2 | HEIGHT: 70 IN | WEIGHT: 278 LBS | DIASTOLIC BLOOD PRESSURE: 65 MMHG | HEART RATE: 79 BPM | OXYGEN SATURATION: 95 %

## 2025-06-10 DIAGNOSIS — Z79.4 TYPE 2 DIABETES MELLITUS WITH CHRONIC KIDNEY DISEASE, WITH LONG-TERM CURRENT USE OF INSULIN, UNSPECIFIED CKD STAGE (MULTI): ICD-10-CM

## 2025-06-10 DIAGNOSIS — I48.0 PAROXYSMAL ATRIAL FIBRILLATION (MULTI): ICD-10-CM

## 2025-06-10 DIAGNOSIS — E11.40 TYPE 2 DIABETES MELLITUS WITH DIABETIC NEUROPATHY, WITHOUT LONG-TERM CURRENT USE OF INSULIN: ICD-10-CM

## 2025-06-10 DIAGNOSIS — E11.42 DIABETIC POLYNEUROPATHY ASSOCIATED WITH TYPE 2 DIABETES MELLITUS: ICD-10-CM

## 2025-06-10 DIAGNOSIS — I50.33 ACUTE ON CHRONIC DIASTOLIC CONGESTIVE HEART FAILURE: ICD-10-CM

## 2025-06-10 DIAGNOSIS — E11.69 HYPERLIPIDEMIA ASSOCIATED WITH TYPE 2 DIABETES MELLITUS: ICD-10-CM

## 2025-06-10 DIAGNOSIS — I50.23 ACUTE ON CHRONIC SYSTOLIC CONGESTIVE HEART FAILURE: ICD-10-CM

## 2025-06-10 DIAGNOSIS — E78.5 HYPERLIPIDEMIA ASSOCIATED WITH TYPE 2 DIABETES MELLITUS: ICD-10-CM

## 2025-06-10 DIAGNOSIS — E66.01 MORBID (SEVERE) OBESITY DUE TO EXCESS CALORIES (MULTI): ICD-10-CM

## 2025-06-10 DIAGNOSIS — I44.2 COMPLETE HEART BLOCK: ICD-10-CM

## 2025-06-10 DIAGNOSIS — I15.2 HYPERTENSION ASSOCIATED WITH DIABETES: ICD-10-CM

## 2025-06-10 DIAGNOSIS — E11.59 HYPERTENSION ASSOCIATED WITH DIABETES: ICD-10-CM

## 2025-06-10 DIAGNOSIS — Z00.00 ROUTINE GENERAL MEDICAL EXAMINATION AT HEALTH CARE FACILITY: Primary | ICD-10-CM

## 2025-06-10 DIAGNOSIS — J42 CHRONIC BRONCHITIS, UNSPECIFIED CHRONIC BRONCHITIS TYPE (MULTI): ICD-10-CM

## 2025-06-10 DIAGNOSIS — E11.51 DIABETES MELLITUS TYPE 2 WITH PERIPHERAL ARTERY DISEASE: ICD-10-CM

## 2025-06-10 DIAGNOSIS — E11.22 TYPE 2 DIABETES MELLITUS WITH CHRONIC KIDNEY DISEASE, WITH LONG-TERM CURRENT USE OF INSULIN, UNSPECIFIED CKD STAGE (MULTI): ICD-10-CM

## 2025-06-10 PROCEDURE — 1160F RVW MEDS BY RX/DR IN RCRD: CPT

## 2025-06-10 PROCEDURE — 1170F FXNL STATUS ASSESSED: CPT

## 2025-06-10 PROCEDURE — 1036F TOBACCO NON-USER: CPT

## 2025-06-10 PROCEDURE — G0439 PPPS, SUBSEQ VISIT: HCPCS

## 2025-06-10 PROCEDURE — 3008F BODY MASS INDEX DOCD: CPT

## 2025-06-10 PROCEDURE — 3078F DIAST BP <80 MM HG: CPT

## 2025-06-10 PROCEDURE — 1159F MED LIST DOCD IN RCRD: CPT

## 2025-06-10 PROCEDURE — 3074F SYST BP LT 130 MM HG: CPT

## 2025-06-10 PROCEDURE — 3044F HG A1C LEVEL LT 7.0%: CPT

## 2025-06-10 PROCEDURE — 99397 PER PM REEVAL EST PAT 65+ YR: CPT

## 2025-06-10 ASSESSMENT — PATIENT HEALTH QUESTIONNAIRE - PHQ9
SUM OF ALL RESPONSES TO PHQ9 QUESTIONS 1 AND 2: 0
1. LITTLE INTEREST OR PLEASURE IN DOING THINGS: NOT AT ALL
2. FEELING DOWN, DEPRESSED OR HOPELESS: NOT AT ALL
1. LITTLE INTEREST OR PLEASURE IN DOING THINGS: NOT AT ALL
SUM OF ALL RESPONSES TO PHQ9 QUESTIONS 1 AND 2: 0
2. FEELING DOWN, DEPRESSED OR HOPELESS: NOT AT ALL

## 2025-06-10 ASSESSMENT — ENCOUNTER SYMPTOMS
CONSTIPATION: 0
WHEEZING: 0
DIFFICULTY URINATING: 0
FATIGUE: 0
SPEECH DIFFICULTY: 0
FREQUENCY: 0
DIZZINESS: 0
FEVER: 0
BLOOD IN STOOL: 0
AGITATION: 0
VOMITING: 0
PALPITATIONS: 0
HYPERACTIVE: 0
SINUS PRESSURE: 0
JOINT SWELLING: 0
VOICE CHANGE: 0
DIARRHEA: 0
NUMBNESS: 0
COUGH: 0
ABDOMINAL PAIN: 0
EYE ITCHING: 0
HEADACHES: 0
SLEEP DISTURBANCE: 0
NERVOUS/ANXIOUS: 0
RHINORRHEA: 0
MYALGIAS: 0
SINUS PAIN: 0
BACK PAIN: 0
WOUND: 0
SORE THROAT: 0
EYE DISCHARGE: 0
DYSPHORIC MOOD: 0
WEAKNESS: 0
SHORTNESS OF BREATH: 0
EYE PAIN: 0
CHEST TIGHTNESS: 0
NECK PAIN: 0
HEMATURIA: 0

## 2025-06-10 ASSESSMENT — ACTIVITIES OF DAILY LIVING (ADL)
GROCERY_SHOPPING: INDEPENDENT
MANAGING_FINANCES: INDEPENDENT
DOING_HOUSEWORK: INDEPENDENT
TAKING_MEDICATION: INDEPENDENT
BATHING: INDEPENDENT
DRESSING: INDEPENDENT

## 2025-06-10 NOTE — ASSESSMENT & PLAN NOTE
Tito was seen today for follow-up and medicare annual wellness visit subsequent.  Diagnoses and all orders for this visit:  Routine general medical examination at [facility] care facility  -     1 Year Follow Up In Primary Care - Wellness Exam  -     1 Year Follow Up In Primary Care - Wellness Exam; Future  Chronic bronchitis, unspecified chronic bronchitis type (Multi)  Comments:  lungs clear today  Acute on chronic diastolic congestive heart failure  Comments:  bp good today, follows with cardiology next appt in aug  Acute on chronic systolic congestive heart failure  Comments:  bp good today, follows with cardiology next appt in aug  Complete heart block  Comments:  bp good today, follows with cardiology next appt in aug  Paroxysmal atrial fibrillation (Multi)  Comments:  bp good today, follows with cardiology next appt in aug  Hypertension associated with diabetes  Comments:  bp good today, follows with cardiology next appt in aug  Diabetes mellitus type 2 with peripheral artery disease  Comments:  follows with endo, will do a1c in sep  Diabetic polyneuropathy associated with type 2 diabetes mellitus  Comments:  follows with endo, will do a1c in sep  Hyperlipidemia associated with type 2 diabetes mellitus  Type 2 diabetes mellitus with diabetic neuropathy, without long-term current use of insulin  Comments:  follows with endo, will do a1c in sep  Orders:  -     Albumin-Creatinine Ratio, Urine Random  Type 2 diabetes mellitus with chronic kidney disease, with long-term current use of insulin, unspecified CKD stage (Multi)  Comments:  follows with endo, will do a1c in sep  Orders:  -     Albumin-Creatinine Ratio, Urine Random  Morbid (severe) obesity due to excess calories (Multi)  Comments:  encouraged more activity increase from 1/2 mile to 1 miles 5 days a week

## 2025-06-10 NOTE — PROGRESS NOTES
Subjective   Reason for Visit: Tito Christian is an 64 y.o. male here for a Medicare Wellness visit.     Past Medical, Surgical, and Family History reviewed and updated in chart.    Reviewed all medications by prescribing practitioner or clinical pharmacist (such as prescriptions, OTCs, herbal therapies and supplements) and documented in the medical record.    Pt is here for annual wellness.  Reports overall health is good    Do you take any herbs or supplements that were not prescribed by a doctor? no  Are you taking calcium supplements? no  Are you taking aspirin daily? no  Colonoscopy: uptodate  Fasting blood work: up to date  Last eye exam: march 2025  Last dental Exam: been awhile  Exercise:1/2 mile 5 days a week  Mood:pleasant  Sleep: off and on   Diet:  ok  Occupation:  going to ball games  Do you have pain that bothers you in your daily life? Yes neck due to arthritis    1. Patient Counseling:  --Nutrition: Stressed importance of moderation in sodium/caffeine intake, saturated fat and cholesterol, caloric balance, sufficient intake of fresh fruits, vegetables, fiber, calcium, iron, and 1 mg of folate supplement per day (for females capable of pregnancy).  --Discussed the issue of estrogen replacement, calcium supplement, and the daily use of baby aspirin as appropriate per pt.  --Exercise: Stressed the importance of regular exercise.   --Substance Abuse: Discussed cessation/primary prevention of tobacco, alcohol, or other drug use; driving or other dangerous activities under the influence; availability of treatment for abuse.    --Sexuality: Discussed sexually transmitted diseases, partner selection, use of condoms, avoidance of unintended pregnancy  and contraceptive alternatives.   --Injury prevention: Discussed safety belts, safety helmets, smoke detector, smoking near bedding or upholstery.   --Dental health: Discussed importance of regular tooth brushing, flossing, and dental visits.  --Immunizations  "reviewed.  --Discussed benefits of colon cancer screening.  --After hours service discussed with patient  2 Discussed the patient's BMI.  The BMI is above average. The patient received Provided instructions on dietary changes  Provided instructions on exercise because they have an above normal BMI.  3 Follow up as needed for acute illness            Patient Care Team:  HUBER Hancock-CNP as PCP - General (Family Medicine)  Elizabeth Pruett DO as PCP - [employer] Advantage PCP  MARVIN Don as PCP - [employer]vantage PCP     Review of Systems   Constitutional:  Negative for fatigue and fever.   HENT:  Negative for congestion, ear discharge, ear pain, nosebleeds, postnasal drip, rhinorrhea, sinus pressure, sinus pain, sore throat, tinnitus and voice change.    Eyes:  Negative for pain, discharge and itching.   Respiratory:  Negative for cough, chest tightness, shortness of breath and wheezing.    Cardiovascular:  Positive for chest pain. Negative for palpitations and leg swelling.        Using nirto about once a month     Gastrointestinal:  Negative for abdominal pain, blood in stool, constipation, diarrhea and vomiting.   Endocrine: Negative for cold intolerance, heat intolerance and polyuria.   Genitourinary:  Negative for difficulty urinating, frequency, hematuria, penile pain, penile swelling, scrotal swelling, testicular pain and urgency.   Musculoskeletal:  Negative for back pain, gait problem, joint swelling, myalgias and neck pain.   Skin:  Negative for rash and wound.   Neurological:  Negative for dizziness, speech difficulty, weakness, numbness and headaches.   Psychiatric/Behavioral:  Negative for agitation, dysphoric mood and sleep disturbance. The patient is not nervous/anxious and is not hyperactive.        Objective   Vitals:  /65   Pulse 79   Ht 1.778 m (5' 10\")   Wt 126 kg (278 lb)   SpO2 95%   BMI 39.89 kg/m²       Physical Exam  Constitutional:     "   Appearance: Normal appearance.   HENT:      Head: Normocephalic and atraumatic.      Right Ear: Tympanic membrane and external ear normal.      Left Ear: Tympanic membrane and external ear normal.      Nose: Nose normal.      Mouth/Throat:      Mouth: Mucous membranes are moist.      Pharynx: Oropharynx is clear. Uvula midline.   Eyes:      General: Lids are normal.      Extraocular Movements: Extraocular movements intact.      Pupils: Pupils are equal, round, and reactive to light.   Neck:      Thyroid: No thyromegaly or thyroid tenderness.   Cardiovascular:      Rate and Rhythm: Normal rate and regular rhythm.      Heart sounds: Normal heart sounds.   Pulmonary:      Effort: Pulmonary effort is normal.      Breath sounds: Normal breath sounds.   Abdominal:      General: Bowel sounds are normal.      Palpations: Abdomen is soft.      Tenderness: There is no abdominal tenderness. There is no guarding.   Musculoskeletal:         General: No swelling. Normal range of motion.      Cervical back: Normal range of motion.      Right lower leg: No edema.      Left lower leg: No edema.   Lymphadenopathy:      Head:      Right side of head: No submental, submandibular or tonsillar adenopathy.      Left side of head: No submental, submandibular or tonsillar adenopathy.      Cervical: No cervical adenopathy.   Skin:     General: Skin is warm and dry.      Capillary Refill: Capillary refill takes less than 2 seconds.      Coloration: Skin is not jaundiced.      Findings: No lesion or rash.   Neurological:      General: No focal deficit present.      Mental Status: He is alert and oriented to person, place, and time.   Psychiatric:         Mood and Affect: Mood normal.         Behavior: Behavior normal.         Thought Content: Thought content normal.         Judgment: Judgment normal.         Assessment & Plan  Routine general medical examination at health care facility    Orders:    1 Year Follow Up In Primary Care -  Wellness Exam    1 Year Follow Up In Primary Care - Wellness Exam; Future    Chronic bronchitis, unspecified chronic bronchitis type (Multi)         Acute on chronic diastolic congestive heart failure         Acute on chronic systolic congestive heart failure         Complete heart block         Paroxysmal atrial fibrillation (Multi)         Hypertension associated with diabetes         Diabetes mellitus type 2 with peripheral artery disease         Diabetic polyneuropathy associated with type 2 diabetes mellitus         Hyperlipidemia associated with type 2 diabetes mellitus         Type 2 diabetes mellitus with diabetic neuropathy, without long-term current use of insulin    Orders:    Albumin-Creatinine Ratio, Urine Random    Type 2 diabetes mellitus with chronic kidney disease, with long-term current use of insulin, unspecified CKD stage (Multi)    Orders:    Albumin-Creatinine Ratio, Urine Random    Morbid (severe) obesity due to excess calories (Multi)       Tito was seen today for follow-up and medicare annual wellness visit subsequent.  Diagnoses and all orders for this visit:  Routine general medical examination at [facility] care facility  -     1 Year Follow Up In Primary Care - Wellness Exam  -     1 Year Follow Up In Primary Care - Wellness Exam; Future  Chronic bronchitis, unspecified chronic bronchitis type (Multi)  Comments:  lungs clear today  Acute on chronic diastolic congestive heart failure  Comments:  bp good today, follows with cardiology next appt in aug  Acute on chronic systolic congestive heart failure  Comments:  bp good today, follows with cardiology next appt in aug  Complete heart block  Comments:  bp good today, follows with cardiology next appt in aug  Paroxysmal atrial fibrillation (Multi)  Comments:  bp good today, follows with cardiology next appt in aug  Hypertension associated with diabetes  Comments:  bp good today, follows with cardiology next appt in aug  Diabetes mellitus  type 2 with peripheral artery disease  Comments:  follows with endo, will do a1c in sep  Diabetic polyneuropathy associated with type 2 diabetes mellitus  Comments:  follows with endo, will do a1c in sep  Hyperlipidemia associated with type 2 diabetes mellitus  Type 2 diabetes mellitus with diabetic neuropathy, without long-term current use of insulin  Comments:  follows with endo, will do a1c in sep  Orders:  -     Albumin-Creatinine Ratio, Urine Random  Type 2 diabetes mellitus with chronic kidney disease, with long-term current use of insulin, unspecified CKD stage (Multi)  Comments:  follows with endo, will do a1c in sep  Orders:  -     Albumin-Creatinine Ratio, Urine Random  Morbid (severe) obesity due to excess calories (Multi)  Comments:  encouraged more activity increase from 1/2 mile to 1 miles 5 days a week

## 2025-06-11 LAB
ALBUMIN/CREAT UR: 11 MG/G CREAT
CREAT UR-MCNC: 92 MG/DL (ref 20–320)
MICROALBUMIN UR-MCNC: 1 MG/DL

## 2025-06-27 DIAGNOSIS — N40.1 BPH WITH OBSTRUCTION/LOWER URINARY TRACT SYMPTOMS: ICD-10-CM

## 2025-06-27 DIAGNOSIS — N13.8 BPH WITH OBSTRUCTION/LOWER URINARY TRACT SYMPTOMS: ICD-10-CM

## 2025-07-03 DIAGNOSIS — E11.51 DIABETES MELLITUS TYPE 2 WITH PERIPHERAL ARTERY DISEASE: ICD-10-CM

## 2025-07-08 RX ORDER — TAMSULOSIN HYDROCHLORIDE 0.4 MG/1
0.8 CAPSULE ORAL DAILY
Qty: 200 CAPSULE | Refills: 2 | Status: SHIPPED | OUTPATIENT
Start: 2025-07-08

## 2025-07-10 DIAGNOSIS — E11.51 DIABETES MELLITUS TYPE 2 WITH PERIPHERAL ARTERY DISEASE: ICD-10-CM

## 2025-07-10 RX ORDER — METFORMIN HYDROCHLORIDE 500 MG/1
500 TABLET ORAL
Qty: 180 TABLET | Refills: 3 | Status: SHIPPED | OUTPATIENT
Start: 2025-07-10

## 2025-07-11 DIAGNOSIS — E11.51 DIABETES MELLITUS TYPE 2 WITH PERIPHERAL ARTERY DISEASE: ICD-10-CM

## 2025-07-12 RX ORDER — INSULIN GLARGINE 100 [IU]/ML
INJECTION, SOLUTION SUBCUTANEOUS
Qty: 60 ML | Refills: 2 | Status: SHIPPED | OUTPATIENT
Start: 2025-07-12

## 2025-07-24 DIAGNOSIS — N39.0 URINARY TRACT INFECTION WITHOUT HEMATURIA, SITE UNSPECIFIED: ICD-10-CM

## 2025-07-27 LAB — BACTERIA UR CULT: NORMAL

## 2025-07-29 ENCOUNTER — TELEPHONE (OUTPATIENT)
Dept: UROLOGY | Facility: HOSPITAL | Age: 65
End: 2025-07-29

## 2025-07-29 RX ORDER — CIPROFLOXACIN 500 MG/1
500 TABLET, FILM COATED ORAL 2 TIMES DAILY
Qty: 20 TABLET | Refills: 0 | Status: SHIPPED | OUTPATIENT
Start: 2025-07-29 | End: 2025-08-08

## 2025-08-13 DIAGNOSIS — E11.51 DIABETES MELLITUS TYPE 2 WITH PERIPHERAL ARTERY DISEASE: ICD-10-CM

## 2025-08-14 RX ORDER — BLOOD SUGAR DIAGNOSTIC
STRIP MISCELLANEOUS
Qty: 300 STRIP | Refills: 2 | Status: SHIPPED | OUTPATIENT
Start: 2025-08-14

## 2025-08-26 ENCOUNTER — TELEPHONE (OUTPATIENT)
Dept: ENDOCRINOLOGY | Facility: CLINIC | Age: 65
End: 2025-08-26
Payer: MEDICARE

## 2025-08-26 DIAGNOSIS — E11.9 TYPE 2 DIABETES MELLITUS WITHOUT COMPLICATION, WITH LONG-TERM CURRENT USE OF INSULIN: Primary | ICD-10-CM

## 2025-08-26 DIAGNOSIS — Z79.4 TYPE 2 DIABETES MELLITUS WITHOUT COMPLICATION, WITH LONG-TERM CURRENT USE OF INSULIN: Primary | ICD-10-CM

## 2025-08-26 RX ORDER — BLOOD SUGAR DIAGNOSTIC
STRIP MISCELLANEOUS
Qty: 300 STRIP | Refills: 3 | Status: SHIPPED | OUTPATIENT
Start: 2025-08-26

## 2025-08-26 RX ORDER — LANCETS
EACH MISCELLANEOUS
Qty: 300 EACH | Refills: 3 | Status: SHIPPED | OUTPATIENT
Start: 2025-08-26

## 2025-08-26 RX ORDER — BLOOD-GLUCOSE METER
EACH MISCELLANEOUS
Qty: 1 EACH | Refills: 0 | Status: SHIPPED | OUTPATIENT
Start: 2025-08-26

## 2025-09-04 ENCOUNTER — APPOINTMENT (OUTPATIENT)
Dept: OPHTHALMOLOGY | Facility: CLINIC | Age: 65
End: 2025-09-04
Payer: MEDICARE

## 2025-09-04 ASSESSMENT — CONF VISUAL FIELD
OD_INFERIOR_TEMPORAL_RESTRICTION: 0
OS_SUPERIOR_NASAL_RESTRICTION: 0
OS_SUPERIOR_TEMPORAL_RESTRICTION: 0
OD_INFERIOR_NASAL_RESTRICTION: 0
OS_INFERIOR_NASAL_RESTRICTION: 0
OD_NORMAL: 1
OS_INFERIOR_TEMPORAL_RESTRICTION: 0
OS_NORMAL: 1
OD_SUPERIOR_NASAL_RESTRICTION: 0
METHOD: COUNTING FINGERS
OD_SUPERIOR_TEMPORAL_RESTRICTION: 0

## 2025-09-04 ASSESSMENT — CUP TO DISC RATIO
OS_RATIO: 0.3
OD_RATIO: 0.3

## 2025-09-04 ASSESSMENT — SLIT LAMP EXAM - LIDS
COMMENTS: GOOD POSITION
COMMENTS: GOOD POSITION

## 2025-09-04 ASSESSMENT — TONOMETRY
OS_IOP_MMHG: 19
OD_IOP_MMHG: 22
IOP_METHOD: GOLDMANN APPLANATION

## 2025-09-04 ASSESSMENT — ENCOUNTER SYMPTOMS
HEMATOLOGIC/LYMPHATIC NEGATIVE: 0
CARDIOVASCULAR NEGATIVE: 0
CONSTITUTIONAL NEGATIVE: 0
ENDOCRINE NEGATIVE: 0
RESPIRATORY NEGATIVE: 0
NEUROLOGICAL NEGATIVE: 0
PSYCHIATRIC NEGATIVE: 0
GASTROINTESTINAL NEGATIVE: 0
ALLERGIC/IMMUNOLOGIC NEGATIVE: 0
MUSCULOSKELETAL NEGATIVE: 0
EYES NEGATIVE: 1

## 2025-09-04 ASSESSMENT — VISUAL ACUITY
OD_CC: 20/25
OS_CC+: -2
METHOD: SNELLEN - LINEAR
OS_CC: 20/25
OD_CC+: -1
CORRECTION_TYPE: GLASSES

## 2025-09-04 ASSESSMENT — REFRACTION_WEARINGRX
OS_AXIS: 165
OD_ADD: +2.50
OS_CYLINDER: +0.50
OD_SPHERE: -1.75
OD_CYLINDER: +1.00
OD_AXIS: 085
OS_SPHERE: -3.25
OS_ADD: +2.50

## 2025-09-04 ASSESSMENT — PACHYMETRY
OD_CT(UM): 557
OS_CT(UM): 565

## 2025-09-04 ASSESSMENT — EXTERNAL EXAM - LEFT EYE: OS_EXAM: NORMAL

## 2025-09-04 ASSESSMENT — EXTERNAL EXAM - RIGHT EYE: OD_EXAM: NORMAL

## 2025-09-16 ENCOUNTER — APPOINTMENT (OUTPATIENT)
Dept: OPHTHALMOLOGY | Facility: CLINIC | Age: 65
End: 2025-09-16
Payer: MEDICARE

## 2025-09-22 ENCOUNTER — APPOINTMENT (OUTPATIENT)
Dept: ENDOCRINOLOGY | Facility: CLINIC | Age: 65
End: 2025-09-22
Payer: MEDICARE

## 2025-11-12 ENCOUNTER — APPOINTMENT (OUTPATIENT)
Dept: OPHTHALMOLOGY | Facility: CLINIC | Age: 65
End: 2025-11-12
Payer: MEDICARE

## 2025-11-24 ENCOUNTER — APPOINTMENT (OUTPATIENT)
Dept: UROLOGY | Facility: CLINIC | Age: 65
End: 2025-11-24
Payer: MEDICARE

## 2025-12-10 ENCOUNTER — APPOINTMENT (OUTPATIENT)
Dept: PRIMARY CARE | Facility: CLINIC | Age: 65
End: 2025-12-10
Payer: MEDICARE

## 2026-06-04 ENCOUNTER — APPOINTMENT (OUTPATIENT)
Dept: OPHTHALMOLOGY | Facility: CLINIC | Age: 66
End: 2026-06-04
Payer: MEDICARE